# Patient Record
Sex: FEMALE
[De-identification: names, ages, dates, MRNs, and addresses within clinical notes are randomized per-mention and may not be internally consistent; named-entity substitution may affect disease eponyms.]

---

## 2017-06-29 NOTE — MAMMOGRAPHY REPORT
BILATERAL DIGITAL SCREENING MAMMOGRAM with CAD: 06/29/17 08:11:00



CLINICAL: Routine screening.



COMPARISON:06/28/16



FINDINGS: The breasts are almost entirely fatty. No mass, architectural 

distortion or suspicious calcifications.



IMPRESSION: No mammographic evidence of malignancy.



BI-RADS CATEGORY: 1 - - Negative



RECOMMENDATION: Routine mammographic screening in one year.





COMMENT:

Patient follow-up letters are generated by our [employer] application.

## 2019-01-14 ENCOUNTER — HOSPITAL ENCOUNTER (OUTPATIENT)
Dept: HOSPITAL 5 - WOUND | Age: 64
Discharge: HOME | End: 2019-01-14
Attending: SURGERY
Payer: COMMERCIAL

## 2019-01-14 DIAGNOSIS — L97.822: ICD-10-CM

## 2019-01-14 DIAGNOSIS — I70.248: Primary | ICD-10-CM

## 2019-01-14 DIAGNOSIS — Z95.5: ICD-10-CM

## 2019-01-14 DIAGNOSIS — I25.10: ICD-10-CM

## 2019-01-14 DIAGNOSIS — Z87.891: ICD-10-CM

## 2019-01-14 DIAGNOSIS — E78.00: ICD-10-CM

## 2019-01-14 PROCEDURE — A6250 SKIN SEAL PROTECT MOISTURIZR: HCPCS

## 2019-01-21 ENCOUNTER — HOSPITAL ENCOUNTER (OUTPATIENT)
Dept: HOSPITAL 5 - WOUND | Age: 64
Discharge: HOME | End: 2019-01-21
Attending: SURGERY
Payer: COMMERCIAL

## 2019-01-21 DIAGNOSIS — I70.248: Primary | ICD-10-CM

## 2019-01-21 DIAGNOSIS — L97.822: ICD-10-CM

## 2019-01-21 DIAGNOSIS — Z95.5: ICD-10-CM

## 2019-01-21 DIAGNOSIS — Z87.891: ICD-10-CM

## 2019-01-21 DIAGNOSIS — E78.00: ICD-10-CM

## 2019-01-21 DIAGNOSIS — I25.10: ICD-10-CM

## 2019-01-21 PROCEDURE — 97605 NEG PRS WND THER DME<=50SQCM: CPT

## 2019-02-04 ENCOUNTER — HOSPITAL ENCOUNTER (OUTPATIENT)
Dept: HOSPITAL 5 - WOUND | Age: 64
Discharge: HOME | End: 2019-02-04
Attending: SURGERY
Payer: COMMERCIAL

## 2019-02-04 DIAGNOSIS — L97.821: Primary | ICD-10-CM

## 2019-02-04 DIAGNOSIS — I25.10: ICD-10-CM

## 2019-02-04 DIAGNOSIS — Z87.891: ICD-10-CM

## 2019-02-04 DIAGNOSIS — E78.00: ICD-10-CM

## 2019-02-04 DIAGNOSIS — Z95.818: ICD-10-CM

## 2019-02-04 PROCEDURE — 87186 SC STD MICRODIL/AGAR DIL: CPT

## 2019-02-04 PROCEDURE — 87075 CULTR BACTERIA EXCEPT BLOOD: CPT

## 2019-02-04 PROCEDURE — 87116 MYCOBACTERIA CULTURE: CPT

## 2019-02-04 PROCEDURE — G0463 HOSPITAL OUTPT CLINIC VISIT: HCPCS

## 2019-02-04 PROCEDURE — 99215 OFFICE O/P EST HI 40 MIN: CPT

## 2019-02-11 ENCOUNTER — HOSPITAL ENCOUNTER (OUTPATIENT)
Dept: HOSPITAL 5 - WOUND | Age: 64
Discharge: HOME | End: 2019-02-11
Payer: COMMERCIAL

## 2019-02-18 ENCOUNTER — HOSPITAL ENCOUNTER (OUTPATIENT)
Dept: HOSPITAL 5 - WOUND | Age: 64
Discharge: HOME | End: 2019-02-18
Attending: SURGERY
Payer: COMMERCIAL

## 2019-02-18 DIAGNOSIS — E78.00: ICD-10-CM

## 2019-02-18 DIAGNOSIS — Z87.891: ICD-10-CM

## 2019-02-18 DIAGNOSIS — I25.10: ICD-10-CM

## 2019-02-18 DIAGNOSIS — L97.822: Primary | ICD-10-CM

## 2019-02-21 ENCOUNTER — HOSPITAL ENCOUNTER (INPATIENT)
Dept: HOSPITAL 5 - ED | Age: 64
LOS: 8 days | Discharge: HOME | DRG: 871 | End: 2019-03-01
Attending: INTERNAL MEDICINE | Admitting: INTERNAL MEDICINE
Payer: COMMERCIAL

## 2019-02-21 DIAGNOSIS — S81.802A: ICD-10-CM

## 2019-02-21 DIAGNOSIS — D64.9: ICD-10-CM

## 2019-02-21 DIAGNOSIS — R07.81: ICD-10-CM

## 2019-02-21 DIAGNOSIS — A41.9: Primary | ICD-10-CM

## 2019-02-21 DIAGNOSIS — F32.9: ICD-10-CM

## 2019-02-21 DIAGNOSIS — Z95.5: ICD-10-CM

## 2019-02-21 DIAGNOSIS — I25.10: ICD-10-CM

## 2019-02-21 DIAGNOSIS — Z79.899: ICD-10-CM

## 2019-02-21 DIAGNOSIS — J96.01: ICD-10-CM

## 2019-02-21 DIAGNOSIS — J47.9: ICD-10-CM

## 2019-02-21 DIAGNOSIS — Z90.49: ICD-10-CM

## 2019-02-21 DIAGNOSIS — L03.116: ICD-10-CM

## 2019-02-21 DIAGNOSIS — E66.2: ICD-10-CM

## 2019-02-21 DIAGNOSIS — Z82.49: ICD-10-CM

## 2019-02-21 DIAGNOSIS — E87.2: ICD-10-CM

## 2019-02-21 DIAGNOSIS — E87.1: ICD-10-CM

## 2019-02-21 DIAGNOSIS — I10: ICD-10-CM

## 2019-02-21 DIAGNOSIS — R65.21: ICD-10-CM

## 2019-02-21 DIAGNOSIS — I48.92: ICD-10-CM

## 2019-02-21 LAB
ALBUMIN SERPL-MCNC: 3.8 G/DL (ref 3.9–5)
ALT SERPL-CCNC: 15 UNITS/L (ref 7–56)
APTT BLD: 29.2 SEC. (ref 24.2–36.6)
APTT BLD: 34.4 SEC. (ref 24.2–36.6)
BASOPHILS # (AUTO): 0.1 K/MM3 (ref 0–0.1)
BASOPHILS NFR BLD AUTO: 0.5 % (ref 0–1.8)
BILIRUB UR QL STRIP: (no result)
BLOOD UR QL VISUAL: (no result)
BUN SERPL-MCNC: 21 MG/DL (ref 7–17)
BUN/CREAT SERPL: 21 %
CALCIUM SERPL-MCNC: 9.1 MG/DL (ref 8.4–10.2)
EOSINOPHIL # BLD AUTO: 0.1 K/MM3 (ref 0–0.4)
EOSINOPHIL NFR BLD AUTO: 1.3 % (ref 0–4.3)
HCT VFR BLD CALC: 30.3 % (ref 30.3–42.9)
HCT VFR BLD CALC: 37 % (ref 30.3–42.9)
HEMOLYSIS INDEX: 11
HGB BLD-MCNC: 12.1 GM/DL (ref 10.1–14.3)
HGB BLD-MCNC: 9.7 GM/DL (ref 10.1–14.3)
INR PPP: 1.02 (ref 0.87–1.13)
INR PPP: 1.13 (ref 0.87–1.13)
LYMPHOCYTES # BLD AUTO: 0.5 K/MM3 (ref 1.2–5.4)
LYMPHOCYTES NFR BLD AUTO: 4.8 % (ref 13.4–35)
MCHC RBC AUTO-ENTMCNC: 33 % (ref 30–34)
MCV RBC AUTO: 79 FL (ref 79–97)
MONOCYTES # (AUTO): 0.8 K/MM3 (ref 0–0.8)
MONOCYTES % (AUTO): 8.5 % (ref 0–7.3)
MUCOUS THREADS #/AREA URNS HPF: (no result) /HPF
PH UR STRIP: 5 [PH] (ref 5–7)
PLATELET # BLD: 328 K/MM3 (ref 140–440)
PLATELET # BLD: 356 K/MM3 (ref 140–440)
PROT UR STRIP-MCNC: (no result) MG/DL
RBC # BLD AUTO: 4.7 M/MM3 (ref 3.65–5.03)
RBC #/AREA URNS HPF: 6 /HPF (ref 0–6)
T4 FREE SERPL-MCNC: 0.99 NG/DL (ref 0.76–1.46)
UROBILINOGEN UR-MCNC: < 2 MG/DL (ref ?–2)
WBC #/AREA URNS HPF: < 1 /HPF (ref 0–6)

## 2019-02-21 PROCEDURE — 85027 COMPLETE CBC AUTOMATED: CPT

## 2019-02-21 PROCEDURE — 85049 AUTOMATED PLATELET COUNT: CPT

## 2019-02-21 PROCEDURE — 85730 THROMBOPLASTIN TIME PARTIAL: CPT

## 2019-02-21 PROCEDURE — 85379 FIBRIN DEGRADATION QUANT: CPT

## 2019-02-21 PROCEDURE — 84484 ASSAY OF TROPONIN QUANT: CPT

## 2019-02-21 PROCEDURE — 36415 COLL VENOUS BLD VENIPUNCTURE: CPT

## 2019-02-21 PROCEDURE — 94660 CPAP INITIATION&MGMT: CPT

## 2019-02-21 PROCEDURE — 84439 ASSAY OF FREE THYROXINE: CPT

## 2019-02-21 PROCEDURE — 83735 ASSAY OF MAGNESIUM: CPT

## 2019-02-21 PROCEDURE — 85025 COMPLETE CBC W/AUTO DIFF WBC: CPT

## 2019-02-21 PROCEDURE — 93306 TTE W/DOPPLER COMPLETE: CPT

## 2019-02-21 PROCEDURE — 80048 BASIC METABOLIC PNL TOTAL CA: CPT

## 2019-02-21 PROCEDURE — 81001 URINALYSIS AUTO W/SCOPE: CPT

## 2019-02-21 PROCEDURE — 85520 HEPARIN ASSAY: CPT

## 2019-02-21 PROCEDURE — 85610 PROTHROMBIN TIME: CPT

## 2019-02-21 PROCEDURE — 94760 N-INVAS EAR/PLS OXIMETRY 1: CPT

## 2019-02-21 PROCEDURE — 36600 WITHDRAWAL OF ARTERIAL BLOOD: CPT

## 2019-02-21 PROCEDURE — 93010 ELECTROCARDIOGRAM REPORT: CPT

## 2019-02-21 PROCEDURE — 87400 INFLUENZA A/B EACH AG IA: CPT

## 2019-02-21 PROCEDURE — 96361 HYDRATE IV INFUSION ADD-ON: CPT

## 2019-02-21 PROCEDURE — 85018 HEMOGLOBIN: CPT

## 2019-02-21 PROCEDURE — 5A2204Z RESTORATION OF CARDIAC RHYTHM, SINGLE: ICD-10-PCS | Performed by: INTERNAL MEDICINE

## 2019-02-21 PROCEDURE — 84443 ASSAY THYROID STIM HORMONE: CPT

## 2019-02-21 PROCEDURE — 85014 HEMATOCRIT: CPT

## 2019-02-21 PROCEDURE — 02HV33Z INSERTION OF INFUSION DEVICE INTO SUPERIOR VENA CAVA, PERCUTANEOUS APPROACH: ICD-10-PCS | Performed by: INTERNAL MEDICINE

## 2019-02-21 PROCEDURE — 86140 C-REACTIVE PROTEIN: CPT

## 2019-02-21 PROCEDURE — 93970 EXTREMITY STUDY: CPT

## 2019-02-21 PROCEDURE — 96365 THER/PROPH/DIAG IV INF INIT: CPT

## 2019-02-21 PROCEDURE — 93005 ELECTROCARDIOGRAM TRACING: CPT

## 2019-02-21 PROCEDURE — 87086 URINE CULTURE/COLONY COUNT: CPT

## 2019-02-21 PROCEDURE — 94640 AIRWAY INHALATION TREATMENT: CPT

## 2019-02-21 PROCEDURE — 82803 BLOOD GASES ANY COMBINATION: CPT

## 2019-02-21 PROCEDURE — 71045 X-RAY EXAM CHEST 1 VIEW: CPT

## 2019-02-21 PROCEDURE — 87040 BLOOD CULTURE FOR BACTERIA: CPT

## 2019-02-21 PROCEDURE — 71275 CT ANGIOGRAPHY CHEST: CPT

## 2019-02-21 PROCEDURE — 96375 TX/PRO/DX INJ NEW DRUG ADDON: CPT

## 2019-02-21 PROCEDURE — 80053 COMPREHEN METABOLIC PANEL: CPT

## 2019-02-21 PROCEDURE — 82140 ASSAY OF AMMONIA: CPT

## 2019-02-21 PROCEDURE — 71046 X-RAY EXAM CHEST 2 VIEWS: CPT

## 2019-02-21 PROCEDURE — 82805 BLOOD GASES W/O2 SATURATION: CPT

## 2019-02-21 RX ADMIN — Medication SCH ML: at 23:08

## 2019-02-21 RX ADMIN — HEPARIN SODIUM SCH MLS/HR: 5000 INJECTION, SOLUTION INTRAVENOUS at 21:22

## 2019-02-21 RX ADMIN — OXYCODONE AND ACETAMINOPHEN PRN TAB: 5; 325 TABLET ORAL at 23:30

## 2019-02-21 RX ADMIN — BUPROPION HYDROCHLORIDE SCH MG: 100 TABLET, FILM COATED, EXTENDED RELEASE ORAL at 23:35

## 2019-02-21 NOTE — XRAY REPORT
FINAL REPORT



EXAM:  XR CHEST 1V AP



HISTORY:  s/p central line placement 



TECHNIQUE:  Single AP chest 



PRIORS:  None.



FINDINGS:  

There is a right IJ catheter tip the SVC. There is some patchy increased opacity within the left uppe
r lobe which may be acute or chronic finding. No evidence for pneumothorax. Pulmonary vasculature fátima
ears within normal limits. 



IMPRESSION:  

Right IJ catheter in satisfactory position 



Patchy increased opacity in the left upper lobe which may be acute or chronic finding. Continued foll
owup suggested.

## 2019-02-21 NOTE — EMERGENCY DEPARTMENT REPORT
ED Shortness of Breath HPI





- General


Chief Complaint: Dyspnea/Respdistress


Stated Complaint: VICKY


Time Seen by Provider: 02/21/19 10:12


Source: patient, old records reviewed


Mode of arrival: Wheelchair


Limitations: No Limitations





- History of Present Illness


Initial Comments: 





64-year-old female with a past medical history of CAD with stent, obesity, hype

rtension, and previous appendectomy presents to Hospital with complaints of 

shortness of breath and mid chest pain 2 days.  Chest pain worsened last night 

described as pleuritic and worse with deep inspiration.  Patient splinting 

secondary to pain.  Patient was prescribed Bactrim and has been taking 1 tablet 

per week.  She noticed that the medication should have been taken twice a day 

and has increased this dose for the past 2 days.  Patient states she's been 

having some nausea with dry heaves.  She states that she is no longer taking 

aspirin and Plavix due to her previous hematoma in her left leg.  Dr. Steel 

performed surgery on January 4 for her left leg hematoma that resulted from a 

November fall.  Patient states she had a negative stress test this past fall and

denies a history of CHF.  Cardiologist: Dr. Nava PMD: A nurse practitioner in Dr. schuster's formal office.  Patient receives home wound care and continues to see 

Dr. Steel as outpatient.  Dr. Steel prescribed the Bactrim





- Related Data


                                Home Medications











 Medication  Instructions  Recorded  Confirmed  Last Taken


 


Alendronate Sodium [Fosamax] 70 mg PO QWEEK 01/02/19 01/04/19 01/03/19 09:00


 


Atorvastatin [Lipitor] 80 mg PO QHS 01/02/19 01/04/19 01/03/19 21:00


 


Citalopram Hydrobromide 20 mg PO DAILY 01/02/19 01/04/19 01/03/19 09:00





[Citalopram HBr]    


 


Clopidogrel Bisulfate [Clopidogrel] 75 mg PO DAILY 01/02/19 01/04/19 01/03/19 

09:00


 


Cyanocobalamin [Vitamin B-12] 1,000 mcg IM QMONTH 01/02/19 01/04/19 12/14/18


 


Lisinopril [Zestril TAB] 40 mg PO DAILY 01/02/19 01/04/19 01/03/19 09:00


 


Metoprolol [Lopressor TAB] 50 mg PO DAILY 01/02/19 01/04/19 01/03/19 09:00


 


buPROPion HCl [Bupropion HCl ER] 200 mg PO BID 01/02/19 01/04/19 01/03/19 21:00








                                  Previous Rx's











 Medication  Instructions  Recorded  Last Taken  Type


 


oxyCODONE /ACETAMINOPHEN [Percocet 2 tab PO Q6H PRN #30 tablet 01/04/19 Unknown 

Rx





5/325 mg]    











                                    Allergies











Allergy/AdvReac Type Severity Reaction Status Date / Time


 


No Known Allergies Allergy   Verified 02/21/19 09:14














ED Review of Systems


ROS: 


Stated complaint: VICKY


Other details as noted in HPI





Comment: All other systems reviewed and negative





ED Past Medical Hx





- Past Medical History


Previous Medical History?: Yes


Hx Hypertension: Yes (x 10 yrs)





- Surgical History


Past Surgical History?: Yes


Hx Coronary Stent: Yes (x1 2008)


Hx Appendectomy: Yes





- Social History


Smoking Status: Never Smoker


Substance Use Type: None





- Medications


Home Medications: 


                                Home Medications











 Medication  Instructions  Recorded  Confirmed  Last Taken  Type


 


Alendronate Sodium [Fosamax] 70 mg PO QWEEK 01/02/19 01/04/19 01/03/19 09:00 

History


 


Atorvastatin [Lipitor] 80 mg PO QHS 01/02/19 01/04/19 01/03/19 21:00 History


 


Citalopram Hydrobromide 20 mg PO DAILY 01/02/19 01/04/19 01/03/19 09:00 History





[Citalopram HBr]     


 


Clopidogrel Bisulfate [Clopidogrel] 75 mg PO DAILY 01/02/19 01/04/19 01/03/19 

09:00 History


 


Cyanocobalamin [Vitamin B-12] 1,000 mcg IM QMONTH 01/02/19 01/04/19 12/14/18 

History


 


Lisinopril [Zestril TAB] 40 mg PO DAILY 01/02/19 01/04/19 01/03/19 09:00 History


 


Metoprolol [Lopressor TAB] 50 mg PO DAILY 01/02/19 01/04/19 01/03/19 09:00 

History


 


buPROPion HCl [Bupropion HCl ER] 200 mg PO BID 01/02/19 01/04/19 01/03/19 21:00 

History


 


oxyCODONE /ACETAMINOPHEN [Percocet 2 tab PO Q6H PRN #30 tablet 01/04/19  Unknown

 Rx





5/325 mg]     














ED Physical Exam





- General


Limitations: No Limitations





- Other


Other exam information: 





General: No limitations, patient is alert in no acute distress


Head exam: Atraumatic, normocephalic


Eyes exam: Normal appearance


ENT: Moist mucous membrane


Neck exam: Normal inspection, full range of motion, no meningismus nontender


Respiratory exam: Clear to auscultation bilateral, no wheezes, rales, crackles, 

tachypnea.  Chest wall nontender


Cardiovascular: Tachycardic regular rhythm


Abdomen: Soft, nondistended, and  nontender, with normal bowel sounds, no 

rebound, or guarding


Extremity: Full range of motion, left lower leg with anterior wall with packing.

 No significant purulent drainage or warmth.  Mild erythema.  Mildly tender.


Back: Normal Inspection, full range of motion, no tenderness


Neurologic: Alert, oriented x3, cranial nerves intact, no motor or sensory 

deficit


Psychiatric: normal affect, normal mood


Skin: Warm, dry, intact





ED Course


                                   Vital Signs











  02/21/19 02/21/19 02/21/19





  09:56 15:05 15:40


 


Temperature 98.8 F 100.5 F H 98.8 F


 


Pulse Rate 51 L 155 H 144 H


 


Respiratory 22  22





Rate   


 


Blood Pressure 103/76 113/62 


 


Blood Pressure   102/61





[Left]   


 


O2 Sat by Pulse 97 99 98





Oximetry   














  02/21/19 02/21/19 02/21/19





  16:13 16:14 16:16


 


Temperature   


 


Pulse Rate 150 H 152 H 153 H


 


Respiratory 22 26 H 31 H





Rate   


 


Blood Pressure   104/46


 


Blood Pressure   





[Left]   


 


O2 Sat by Pulse   





Oximetry   














  02/21/19 02/21/19 02/21/19





  16:18 16:20 16:22


 


Temperature   


 


Pulse Rate 152 H 152 H 152 H


 


Respiratory 26 H 33 H 28 H





Rate   


 


Blood Pressure 104/46 104/46 104/46


 


Blood Pressure   





[Left]   


 


O2 Sat by Pulse   





Oximetry   














  02/21/19 02/21/19 02/21/19





  16:24 16:26 16:28


 


Temperature   


 


Pulse Rate 153 H 153 H 152 H


 


Respiratory 30 H 27 H 32 H





Rate   


 


Blood Pressure 104/46 104/46 104/46


 


Blood Pressure   





[Left]   


 


O2 Sat by Pulse   





Oximetry   














  02/21/19 02/21/19 02/21/19





  16:30 16:31 16:56


 


Temperature   


 


Pulse Rate 147 H 152 H 135 H


 


Respiratory 31 H 31 H 30 H





Rate   


 


Blood Pressure 104/46 97/47 118/97


 


Blood Pressure   





[Left]   


 


O2 Sat by Pulse   





Oximetry   














  02/21/19 02/21/19 02/21/19





  16:58 17:00 17:01


 


Temperature   


 


Pulse Rate 153 H 152 H 152 H


 


Respiratory 35 H 32 H 35 H





Rate   


 


Blood Pressure 104/46 91/47 84/39


 


Blood Pressure   





[Left]   


 


O2 Sat by Pulse   





Oximetry   














  02/21/19 02/21/19 02/21/19





  17:02 17:04 17:06


 


Temperature   


 


Pulse Rate 150 H 152 H 148 H


 


Respiratory 33 H 32 H 33 H





Rate   


 


Blood Pressure 84/39 84/39 84/39


 


Blood Pressure   





[Left]   


 


O2 Sat by Pulse   





Oximetry   














  02/21/19 02/21/19 02/21/19





  17:08 17:10 17:12


 


Temperature   


 


Pulse Rate 150 H 148 H 134 H


 


Respiratory 36 H 30 H 34 H





Rate   


 


Blood Pressure 75/38 75/38 75/38


 


Blood Pressure   





[Left]   


 


O2 Sat by Pulse   





Oximetry   














  02/21/19 02/21/19 02/21/19





  17:14 17:16 17:18


 


Temperature   


 


Pulse Rate 142 H 126 H 149 H


 


Respiratory 26 H 32 H 23





Rate   


 


Blood Pressure 75/38 135/86 135/86


 


Blood Pressure   





[Left]   


 


O2 Sat by Pulse   





Oximetry   














  02/21/19 02/21/19 02/21/19





  17:20 17:22 17:24


 


Temperature   


 


Pulse Rate 130 H 126 H 149 H


 


Respiratory 33 H 27 H 27 H





Rate   


 


Blood Pressure 135/86 135/86 135/86


 


Blood Pressure   





[Left]   


 


O2 Sat by Pulse   





Oximetry   














  02/21/19 02/21/19 02/21/19





  17:26 17:28 17:30


 


Temperature   


 


Pulse Rate 136 H 140 H 134 H


 


Respiratory 21 33 H 33 H





Rate   


 


Blood Pressure 135/86 135/86 135/86


 


Blood Pressure   





[Left]   


 


O2 Sat by Pulse   





Oximetry   














  02/21/19 02/21/19 02/21/19





  17:31 17:32 17:34


 


Temperature   


 


Pulse Rate 141 H 138 H 139 H


 


Respiratory 28 H 34 H 28 H





Rate   


 


Blood Pressure 104/55 104/55 104/55


 


Blood Pressure   





[Left]   


 


O2 Sat by Pulse   





Oximetry   














  02/21/19 02/21/19 02/21/19





  17:36 17:38 17:40


 


Temperature   


 


Pulse Rate 145 H 147 H 148 H


 


Respiratory 25 H 36 H 36 H





Rate   


 


Blood Pressure 104/55 104/55 104/55


 


Blood Pressure   





[Left]   


 


O2 Sat by Pulse   98





Oximetry   














  02/21/19 02/21/19 02/21/19





  17:42 17:44 17:46


 


Temperature   


 


Pulse Rate 148 H 143 H 149 H


 


Respiratory 35 H 34 H 34 H





Rate   


 


Blood Pressure 104/55 104/55 85/49


 


Blood Pressure   





[Left]   


 


O2 Sat by Pulse   





Oximetry   














  02/21/19 02/21/19 02/21/19





  17:48 17:50 17:52


 


Temperature   


 


Pulse Rate 143 H 143 H 147 H


 


Respiratory 33 H 33 H 29 H





Rate   


 


Blood Pressure 85/49 85/49 85/49


 


Blood Pressure   





[Left]   


 


O2 Sat by Pulse   





Oximetry   














  02/21/19 02/21/19 02/21/19





  17:54 17:56 17:58


 


Temperature   


 


Pulse Rate 147 H 137 H 141 H


 


Respiratory 30 H 34 H 35 H





Rate   


 


Blood Pressure 85/49 85/49 85/49


 


Blood Pressure   





[Left]   


 


O2 Sat by Pulse   





Oximetry   














  02/21/19 02/21/19 02/21/19





  18:00 18:02 18:04


 


Temperature   


 


Pulse Rate 133 H 141 H 141 H


 


Respiratory 37 H 34 H 36 H





Rate   


 


Blood Pressure 85/49 85/49 85/49


 


Blood Pressure   





[Left]   


 


O2 Sat by Pulse   





Oximetry   














  02/21/19 02/21/19 02/21/19





  18:06 18:08 18:10


 


Temperature   


 


Pulse Rate 142 H 142 H 143 H


 


Respiratory 26 H 37 H 27 H





Rate   


 


Blood Pressure 85/49 85/49 85/49


 


Blood Pressure   





[Left]   


 


O2 Sat by Pulse   





Oximetry   














  02/21/19 02/21/19 02/21/19





  18:12 18:14 18:16


 


Temperature   


 


Pulse Rate 142 H 134 H 138 H


 


Respiratory 33 H 25 H 27 H





Rate   


 


Blood Pressure 85/49 85/49 85/49


 


Blood Pressure   





[Left]   


 


O2 Sat by Pulse   





Oximetry   














  02/21/19 02/21/19 02/21/19





  18:18 18:20 18:22


 


Temperature   


 


Pulse Rate 144 H 142 H 140 H


 


Respiratory 27 H 33 H 28 H





Rate   


 


Blood Pressure 85/49 85/49 85/49


 


Blood Pressure   





[Left]   


 


O2 Sat by Pulse   





Oximetry   














  02/21/19 02/21/19 02/21/19





  18:24 18:26 18:28


 


Temperature   


 


Pulse Rate 137 H 134 H 144 H


 


Respiratory 31 H 29 H 31 H





Rate   


 


Blood Pressure 85/49 85/49 85/49


 


Blood Pressure   





[Left]   


 


O2 Sat by Pulse   





Oximetry   














  02/21/19





  18:30


 


Temperature 


 


Pulse Rate 141 H


 


Respiratory 30 H





Rate 


 


Blood Pressure 85/49


 


Blood Pressure 





[Left] 


 


O2 Sat by Pulse 





Oximetry 














- Consultations


Consultation #1: 





02/21/19 17:10


Case discussed with Dr. JOSE Shanks on call cardiologist for Dr. Nava.  Informed that

 Cardizem bolus did not help with tachycardia and blood pressure drop from 100 

systolic to the 70s after initial bolus.  Recommends digoxin 0.5 mg IV load, 

amiodarone 150 IV load followed by amiodarone drip.


02/21/19 19:49


Received follow-up call from Dr. JOSE Shanks.  Informed that patient remains 

tachycardic in the 130s to 150s.  Will blood pressure cuff chain systolic 

pressure is 100.  Recommends to try metoprolol 5 mg IV.  Recommends additional 

IV fluids.  Patient has received 1 L normal saline eschar and no signs of CHF on

 CT scan after IVF.  Additional 2 L ordered








Consultation #2: 





02/21/19 20:19


Also ordered for Dr. Steel to be done during admission


02/21/19 20:21


Critical care consult ordered





- Central Line Placement


  ** Right IJ


Consent Obtained: written consent


Time Out Performed: Yes


Patient Placed on Monitor/Pulse Ox: Yes


MD Prep: mask, gown, gloves


Central Line Prep: Chlorhexidine scrub, sterile drapes applied


Local Anesthesia Used: Lidocaine 1%


Amount of Anesthesia Used (mls): 5


Ultrasound Used for Placement: Yes


Central Line Lumen Inserted: triple


Bloods Obtained for Lab: Yes


Central Line Position: good blood return, sutured in place with nyl


Dressing Applied: Tegaderm


Post Procedure X-Ray: tip of catheter in good p


Patient Tolerated Procedure: well


Complications: none





ED Medical Decision Making





- Lab Data


Result diagrams: 


                                 02/21/19 10:08





                                 02/21/19 10:08








                                   Lab Results











  02/21/19 02/21/19 02/21/19 Range/Units





  10:08 10:08 10:08 


 


WBC  9.9    (4.5-11.0)  K/mm3


 


RBC  4.70    (3.65-5.03)  M/mm3


 


Hgb  12.1    (10.1-14.3)  gm/dl


 


Hct  37.0    (30.3-42.9)  %


 


MCV  79    (79-97)  fl


 


MCH  26 L    (28-32)  pg


 


MCHC  33    (30-34)  %


 


RDW  17.6 H    (13.2-15.2)  %


 


Plt Count  356    (140-440)  K/mm3


 


Lymph % (Auto)  4.8 L    (13.4-35.0)  %


 


Mono % (Auto)  8.5 H    (0.0-7.3)  %


 


Eos % (Auto)  1.3    (0.0-4.3)  %


 


Baso % (Auto)  0.5    (0.0-1.8)  %


 


Lymph #  0.5 L    (1.2-5.4)  K/mm3


 


Mono #  0.8    (0.0-0.8)  K/mm3


 


Eos #  0.1    (0.0-0.4)  K/mm3


 


Baso #  0.1    (0.0-0.1)  K/mm3


 


Seg Neutrophils %  84.9 H    (40.0-70.0)  %


 


Seg Neutrophils #  8.4 H    (1.8-7.7)  K/mm3


 


PT     (12.2-14.9)  Sec.


 


INR     (0.87-1.13)  


 


APTT     (24.2-36.6)  Sec.


 


D-Dimer     (0-234)  ng/mlDDU


 


VBG pH     (7.320-7.420)  


 


Sodium    134 L  (137-145)  mmol/L


 


Potassium    4.4  (3.6-5.0)  mmol/L


 


Chloride    99.9  ()  mmol/L


 


Carbon Dioxide    20 L  (22-30)  mmol/L


 


Anion Gap    19  mmol/L


 


BUN    21 H  (7-17)  mg/dL


 


Creatinine    1.0  (0.7-1.2)  mg/dL


 


Estimated GFR    56  ml/min


 


BUN/Creatinine Ratio    21  %


 


Glucose    138 H  ()  mg/dL


 


Lactic Acid     (0.7-2.0)  mmol/L


 


Calcium    9.1  (8.4-10.2)  mg/dL


 


Magnesium     (1.7-2.3)  mg/dL


 


Total Bilirubin    0.30  (0.1-1.2)  mg/dL


 


AST    18  (5-40)  units/L


 


ALT    15  (7-56)  units/L


 


Alkaline Phosphatase    93  ()  units/L


 


Troponin T   < 0.010   (0.00-0.029)  ng/mL


 


Total Protein    7.0  (6.3-8.2)  g/dL


 


Albumin    3.8 L  (3.9-5)  g/dL


 


Albumin/Globulin Ratio    1.2  %


 


TSH     (0.270-4.200)  mlU/mL


 


Free T4     (0.76-1.46)  ng/dL














  02/21/19 02/21/19 02/21/19 Range/Units





  15:52 16:57 16:57 


 


WBC     (4.5-11.0)  K/mm3


 


RBC     (3.65-5.03)  M/mm3


 


Hgb     (10.1-14.3)  gm/dl


 


Hct     (30.3-42.9)  %


 


MCV     (79-97)  fl


 


MCH     (28-32)  pg


 


MCHC     (30-34)  %


 


RDW     (13.2-15.2)  %


 


Plt Count     (140-440)  K/mm3


 


Lymph % (Auto)     (13.4-35.0)  %


 


Mono % (Auto)     (0.0-7.3)  %


 


Eos % (Auto)     (0.0-4.3)  %


 


Baso % (Auto)     (0.0-1.8)  %


 


Lymph #     (1.2-5.4)  K/mm3


 


Mono #     (0.0-0.8)  K/mm3


 


Eos #     (0.0-0.4)  K/mm3


 


Baso #     (0.0-0.1)  K/mm3


 


Seg Neutrophils %     (40.0-70.0)  %


 


Seg Neutrophils #     (1.8-7.7)  K/mm3


 


PT   14.0   (12.2-14.9)  Sec.


 


INR   1.02   (0.87-1.13)  


 


APTT   34.4   (24.2-36.6)  Sec.


 


D-Dimer   519.87 H   (0-234)  ng/mlDDU


 


VBG pH     (7.320-7.420)  


 


Sodium     (137-145)  mmol/L


 


Potassium     (3.6-5.0)  mmol/L


 


Chloride     ()  mmol/L


 


Carbon Dioxide     (22-30)  mmol/L


 


Anion Gap     mmol/L


 


BUN     (7-17)  mg/dL


 


Creatinine     (0.7-1.2)  mg/dL


 


Estimated GFR     ml/min


 


BUN/Creatinine Ratio     %


 


Glucose     ()  mg/dL


 


Lactic Acid     (0.7-2.0)  mmol/L


 


Calcium     (8.4-10.2)  mg/dL


 


Magnesium     (1.7-2.3)  mg/dL


 


Total Bilirubin     (0.1-1.2)  mg/dL


 


AST     (5-40)  units/L


 


ALT     (7-56)  units/L


 


Alkaline Phosphatase     ()  units/L


 


Troponin T  < 0.010    (0.00-0.029)  ng/mL


 


Total Protein     (6.3-8.2)  g/dL


 


Albumin     (3.9-5)  g/dL


 


Albumin/Globulin Ratio     %


 


TSH    0.892  (0.270-4.200)  mlU/mL


 


Free T4    0.99  (0.76-1.46)  ng/dL














  02/21/19 02/21/19 02/21/19 Range/Units





  17:20 17:35 17:35 


 


WBC     (4.5-11.0)  K/mm3


 


RBC     (3.65-5.03)  M/mm3


 


Hgb     (10.1-14.3)  gm/dl


 


Hct     (30.3-42.9)  %


 


MCV     (79-97)  fl


 


MCH     (28-32)  pg


 


MCHC     (30-34)  %


 


RDW     (13.2-15.2)  %


 


Plt Count     (140-440)  K/mm3


 


Lymph % (Auto)     (13.4-35.0)  %


 


Mono % (Auto)     (0.0-7.3)  %


 


Eos % (Auto)     (0.0-4.3)  %


 


Baso % (Auto)     (0.0-1.8)  %


 


Lymph #     (1.2-5.4)  K/mm3


 


Mono #     (0.0-0.8)  K/mm3


 


Eos #     (0.0-0.4)  K/mm3


 


Baso #     (0.0-0.1)  K/mm3


 


Seg Neutrophils %     (40.0-70.0)  %


 


Seg Neutrophils #     (1.8-7.7)  K/mm3


 


PT     (12.2-14.9)  Sec.


 


INR     (0.87-1.13)  


 


APTT     (24.2-36.6)  Sec.


 


D-Dimer     (0-234)  ng/mlDDU


 


VBG pH    7.359  (7.320-7.420)  


 


Sodium     (137-145)  mmol/L


 


Potassium     (3.6-5.0)  mmol/L


 


Chloride     ()  mmol/L


 


Carbon Dioxide     (22-30)  mmol/L


 


Anion Gap     mmol/L


 


BUN     (7-17)  mg/dL


 


Creatinine     (0.7-1.2)  mg/dL


 


Estimated GFR     ml/min


 


BUN/Creatinine Ratio     %


 


Glucose     ()  mg/dL


 


Lactic Acid   1.10   (0.7-2.0)  mmol/L


 


Calcium     (8.4-10.2)  mg/dL


 


Magnesium  1.90    (1.7-2.3)  mg/dL


 


Total Bilirubin     (0.1-1.2)  mg/dL


 


AST     (5-40)  units/L


 


ALT     (7-56)  units/L


 


Alkaline Phosphatase     ()  units/L


 


Troponin T     (0.00-0.029)  ng/mL


 


Total Protein     (6.3-8.2)  g/dL


 


Albumin     (3.9-5)  g/dL


 


Albumin/Globulin Ratio     %


 


TSH     (0.270-4.200)  mlU/mL


 


Free T4     (0.76-1.46)  ng/dL














- EKG Data


-: EKG Interpreted by Me


EKG shows normal: axis (qrs =73), QRS complexes (qrsd 86), ST-T waves (no stemi)


Rate: tachycardia (139)





- EKG Data


When compared to previous EKG there are: previous EKG unavailable





02/21/19 20:07


Repeat EKG at 16:46 shows A flutter with 2-1 block rate 151.  QRS axis -57, QRS 

duration 146, no ST elevation MI





- Radiology Data


Radiology results: report reviewed











CHEST TWO VIEWS: 02/21/19 10:22 





CLINICAL: Shortness of breath. No comparison. 





FINDINGS: Somewhat vague opacities in the left upper lobe with volume 


loss and a prominent left hilum with adjacent surgical clips. The left 


lower lobe is clear. The right lung is normally expanded and clear. 


The heart is normal size but is shifted to the left. Mild degenerative 


changes in the spine. No suspicious bone lesions. 





IMPRESSION: Probable chronic postsurgical changes of a left upper lobe 


after partial lobectomy.No acute pneumonia or CHF. 











FINAL REPORT 





PROCEDURE: CT angiogram chest with contrast. 





TECHNIQUE: Computerized tomographic angiography of the chest was performed after

 the IV injection 


of iodinated nonionic contrast including image processing. The image data was 

postprocessed using 2-


dimensional multiplanar reformatted (MPR) and 3-dimensional (MIP and/or volume 

rendered) techniques.








HISTORY: Chest pain, shortness of breath, tachycardia, elevated D-dimer. 





COMPARISON: No prior studies are available for comparison. 





FINDINGS: 


The trachea and central bronchi appear normal. The right lung is clear and well 

expanded. There are


numerous small dilated bronchi in the anterior portion of the left upper lobe. 

These bronchi have 


dense surrounding opacity. The findings are consistent with bronchiectasis with 

surrounding 


fibrosis. There are no definite mass lesions. The remainder of the left lung is 

clear. There are no 


pleural effusions. The thoracic aorta has a normal caliber without evidence of 

dissection. The 


pulmonary arteries enhance normally. There are no signs of pulmonary embolism. 

There is no 


mediastinal adenopathy. The heart size is normal. The adrenal glands are not 

enlarged. The thoracic 


skeleton appears intact. 





IMPRESSION: 


No evidence of pulmonary embolism. Probable chronic fibrosis and bronchiectasis 

in the anterior 


portion of the left upper lobe. 








FINAL REPORT 





EXAM: XR CHEST 1V AP 





HISTORY: s/p central line placement 





TECHNIQUE: Single AP chest 





PRIORS: None. 





FINDINGS: 


There is a right IJ catheter tip the SVC. There is some patchy increased opacity

 within the left 


upper lobe which may be acute or chronic finding. No evidence for pneumothorax. 

Pulmonary 


vasculature appears within normal limits. 





IMPRESSION: 


Right IJ catheter in satisfactory position 





Patchy increased opacity in the left upper lobe which may be acute or chronic 

finding. Continued 


followup suggested. 














- Medical Decision Making





Patient presents to the hospital with A. fib with RVR that is refractory to 

medication in the ED.  It is unclear as whether not this is caused by sepsis or 

primary arrhythmia.  Patient is on antibiotics/Bactrim for her leg but does not 

have any leukocytosis pregnancies although there is a shift) or elevated lactic 

acid.  Patient has pleuritic chest pain and is splinting secondary to pain.  D-

dimer was elevated.  CT angiogram chest was negative for pulmonary embolism.  

Central line placed due to persistent tachycardia and borderline hypotension.  

Case discussed with cardiologist see consult. Hospitalist informed of admission 

Dr Varela. ICU admission orders will be placed.





Patient was empirically given vancomycin for possible leg infection.  Zosyn also

 ordered given increasing left upper lobe opacity with IV hydration.





- Differential Diagnosis


PE, A. fib, a flutter, sepsis


Critical Care Time: Yes


Critical care time in (mins) excluding proc time.: 65


Critical care attestation.: 


If time is entered above; I have spent that time in minutes in the direct care 

of this critically ill patient, excluding procedure time.








ED Disposition


Clinical Impression: 


 Atrial flutter with rapid ventricular response, Pleuritic chest pain, Pulmonary

 infiltrate, Leg wound, left





Disposition: [id number] OP ADMIT IP TO THIS HOSP


Is pt being admited?: Yes


Condition: Stable


Instructions:  Chest Pain (ED)


Time of Disposition: 20:13 (Dr Varela/hospitalist)

## 2019-02-21 NOTE — CAT SCAN REPORT
FINAL REPORT



PROCEDURE:  CT angiogram chest with contrast. 



TECHNIQUE:  Computerized tomographic angiography of the chest was performed after the IV injection of
 iodinated nonionic contrast including image processing. The image data was postprocessed using 2-dim
ensional multiplanar reformatted (MPR) and 3-dimensional (MIP and/or volume rendered) techniques. 



HISTORY:  Chest pain, shortness of breath, tachycardia, elevated D-dimer. 



COMPARISON:  No prior studies are available for comparison.



FINDINGS:  

The trachea and central bronchi appear normal. The right lung is clear and well expanded. There are n
umerous small dilated bronchi in the anterior portion of the left upper lobe. These bronchi have dens
e surrounding opacity. The findings are consistent with bronchiectasis with surrounding fibrosis. The
re are no definite mass lesions. The remainder of the left lung is clear. There are no pleural effusi
ons. The thoracic aorta has a normal caliber without evidence of dissection. The pulmonary arteries e
nhance normally. There are no signs of pulmonary embolism. There is no mediastinal adenopathy. The he
art size is normal. The adrenal glands are not enlarged. The thoracic skeleton appears intact. 



IMPRESSION:  

No evidence of pulmonary embolism. Probable chronic fibrosis and bronchiectasis in the anterior porti
on of the left upper lobe.

## 2019-02-21 NOTE — HISTORY AND PHYSICAL REPORT
History of Present Illness


Chief complaint: 





I cant breathe


History of present illness: 


65 YO Female with MO,Obesity Hypoventilation on CPAP,  CAD S/P Stent Placement, 

HTN presents to ED for evaluation. Pt states that she has experienced shortness 

of breath, chest palpitations, and chest discomfort over the past 2 days with 

worsening symptoms over the same time frame. Pt seen and evaluated by her PCP 

and was initially treated with oral antibiotic therapy without relief of 

symptoms that were suspected secondary to a respiratory tract infection. Pt 

subsequently presents to ED for evaluation. Pt seen and evaluated in ED and 

found to have Atrial Flutter with RVR, Acute Respiratory Failure, as well as 

Left Leg Cellulitis. Pt treated with medical cardioversion with cardizem without

improvement. Cardiology consulted in ED. Pt then placed on amidarone drip and 

heparin drip as per cardiology request. Pt declined synchronized cardioversion. 

Pt admitted to ICU. Pt denies fever, chills, NVD, Trauma, BRBPR, Hemoptysis, or 

recent ill contacts. 





Past History


Past Medical History: CAD, other (Obesity Hypoventilation,)


Past Surgical History: appendectomy, Other (stent placement)


Social history: , lives with family.  denies: smoking, alcohol abuse, 

prescription drug abuse


Family history: hypertension





Medications and Allergies


                                    Allergies











Allergy/AdvReac Type Severity Reaction Status Date / Time


 


No Known Allergies Allergy   Verified 02/21/19 09:14











                                Home Medications











 Medication  Instructions  Recorded  Confirmed  Last Taken  Type


 


Alendronate Sodium [Fosamax] 70 mg PO QWEEK 01/02/19 01/04/19 01/03/19 09:00 

History


 


Atorvastatin [Lipitor] 80 mg PO QHS 01/02/19 01/04/19 01/03/19 21:00 History


 


Citalopram Hydrobromide 20 mg PO DAILY 01/02/19 01/04/19 01/03/19 09:00 History





[Citalopram HBr]     


 


Clopidogrel Bisulfate [Clopidogrel] 75 mg PO DAILY 01/02/19 01/04/19 01/03/19 

09:00 History


 


Cyanocobalamin [Vitamin B-12] 1,000 mcg IM QMONTH 01/02/19 01/04/19 12/14/18 

History


 


Lisinopril [Zestril TAB] 40 mg PO DAILY 01/02/19 01/04/19 01/03/19 09:00 History


 


Metoprolol [Lopressor TAB] 50 mg PO DAILY 01/02/19 01/04/19 01/03/19 09:00 

History


 


buPROPion HCl [Bupropion HCl ER] 200 mg PO BID 01/02/19 01/04/19 01/03/19 21:00 

History


 


oxyCODONE /ACETAMINOPHEN [Percocet 2 tab PO Q6H PRN #30 tablet 01/04/19  Unknown

 Rx





5/325 mg]     











Active Meds: 


Active Medications





Amiodarone HCl 900 mg/ (Dextrose)  500 mls @ 33.33 mls/hr IV AS DIRECT TIAN; 

Protocol


   Last Admin: 02/21/19 18:00 Dose:  1 mg/min, 33.33 mls/hr


   Documented by: 


Vancomycin HCl 2,000 mg/ (Sodium Chloride)  540 mls @ 333 mls/hr IV ONCE ONE; 

Protocol


   Stop: 02/21/19 20:22


Sodium Chloride (Nacl 0.9% 1000 Ml)  1,000 mls @ 999 mls/hr IV BOLUS ONE


   Stop: 02/21/19 20:37


   Last Admin: 02/21/19 20:11 Dose:  999 mls/hr


   Documented by: 


Sodium Chloride (Nacl 0.9% 1000 Ml)  1,000 mls @ 999 mls/hr IV BOLUS ONE


   Stop: 02/21/19 20:44


Piperacillin Sod/Tazobactam Sod (Zosyn/Ns 4.5gm/100ml)  4.5 gm in 100 mls @ 200 

mls/hr IV ONCE ONE


   Stop: 02/21/19 20:39











Review of Systems


Constitutional: no weight loss, no weight gain, no fever, no chills


Ears, nose, mouth and throat: no ear pain, no ear discharge, no tinnitis, no 

decreased hearing, no nose pain


Cardiovascular: chest pain, palpitations, shortness of breath


Respiratory: no cough, no cough with sputum, no excessive sputum, no hemoptysis


Gastrointestinal: no nausea, no vomiting, no diarrhea, no constipation


Genitourinary Female: no pelvic pain, no flank pain, no menorrhagia, no dysuria,

no urinary frequency, no urgency


Rectal: no pain, no incontinence, no bleeding


Musculoskeletal: no neck stiffness, no neck pain, no shooting arm pain, no arm 

numbness/tingling, no low back pain


Integumentary: no rash, no pruritis, no redness, no sores, no wounds


Neurological: no paralysis, no weakness, no parathesias, no numbness


Psychiatric: no anxiety, no memory loss, no change in sleep habits, no sleep 

disturbances, no insomnia, no hypersomnia


Endocrine: no cold intolerance, no heat intolerance, no polyphagia, no excessive

thirst, no polydipsia


Hematologic/Lymphatic: no easy bruising, no easy bleeding


Allergic/Immunologic: no urticaria, no allergic rhinitis, no wheezing





Exam





- Constitutional


Vitals: 


                                        











Temp Pulse Resp BP Pulse Ox


 


 98.8 F   141 H  30 H  85/49   98 


 


 02/21/19 15:40  02/21/19 18:30  02/21/19 18:30  02/21/19 18:30  02/21/19 17:40











General appearance: Present: mild distress, obese





- EENT


Eyes: Present: PERRL


ENT: hearing intact, clear oral mucosa





- Neck


Neck: Present: supple, normal ROM





- Respiratory


Respiratory effort: labored


Respiratory: bilateral: diminished





- Cardiovascular


Heart Sounds: Present: S1 & S2.  Absent: rub, click





- Extremities


Extremities: pulses symmetrical, No edema


Peripheral Pulses: within normal limits





- Abdominal


General gastrointestinal: Present: soft, non-tender, non-distended, normal bowel

sounds


Female genitourinary: Present: normal





- Integumentary


Integumentary: Present: clear, warm, dry





- Musculoskeletal


Musculoskeletal: gait normal, strength equal bilaterally





- Psychiatric


Psychiatric: appropriate mood/affect, intact judgment & insight





- Neurologic


Neurologic: CNII-XII intact, moves all extremities





Results





- Labs


CBC & Chem 7: 


                                 02/22/19 03:53





                                 02/22/19 03:53


Labs: 


                              Abnormal lab results











  02/21/19 02/21/19 02/21/19 Range/Units





  10:08 10:08 16:57 


 


MCH  26 L    (28-32)  pg


 


RDW  17.6 H    (13.2-15.2)  %


 


Lymph % (Auto)  4.8 L    (13.4-35.0)  %


 


Mono % (Auto)  8.5 H    (0.0-7.3)  %


 


Lymph #  0.5 L    (1.2-5.4)  K/mm3


 


Seg Neutrophils %  84.9 H    (40.0-70.0)  %


 


Seg Neutrophils #  8.4 H    (1.8-7.7)  K/mm3


 


D-Dimer    519.87 H  (0-234)  ng/mlDDU


 


Sodium   134 L   (137-145)  mmol/L


 


Carbon Dioxide   20 L   (22-30)  mmol/L


 


BUN   21 H   (7-17)  mg/dL


 


Glucose   138 H   ()  mg/dL


 


Albumin   3.8 L   (3.9-5)  g/dL














Assessment and Plan





- Patient Problems


(1) Atrial flutter with rapid ventricular response


Current Visit: Yes   Status: Acute   


Plan to address problem: 


Admit to ICU: Amiodarone drip, heparin drip as per cardiology request, Pt 

declined synchronized cardioversion, supportive care, cardiology consulted. 





The high probability of a clinically significant, sudden or life threatening 

deterioration of the [cardiac,renal, respiratory] system(s) required my full and

direct attention, intervention and personal management. The aggregate critical 

care time was [65] minutes. This time is in addition to time spent performing 

reported procedures but includes the following: 





[x] Data Review and interpretation 





[x] Patient assessment and monitoring of vital signs 





[x] Documentation 





[x] Medication orders and management








(2) Cellulitis


Current Visit: Yes   Status: Acute   


Qualifiers: 


   Site of cellulitis of extremity: lower extremity   Laterality: left 


Plan to address problem: 


Empiric antibiotic therapy x 1 dose, wound care, CBC, supportive care, Surgery 

team consulted in ED








(3) Respiratory failure


Current Visit: Yes   Status: Acute   


Qualifiers: 


   Chronicity: acute   Respiratory failure complication: hypoxia   Qualified 

Code(s): J96.01 - Acute respiratory failure with hypoxia   


Plan to address problem: 


Supplemental oxygen, nebulizer therapy, NIPPV as clinically indicated, chest x 

ray, pulse oximetry








(4) Obesity hypoventilation syndrome


Current Visit: Yes   Status: Acute   


Plan to address problem: 


Supplemental oxygen, nebulizer therapy, NIPPV qhs, early ambulation, pulmonary 

toilet, balanced diet, increased physical activity at discharge








(5) DVT prophylaxis


Current Visit: Yes   Status: Acute   


Plan to address problem: 


SCD to BLE while in bed

## 2019-02-21 NOTE — EVENT NOTE
Date: 02/21/19


Atrial flutter with RVR.


Patint seen in ER.


Discussed with .


Spoke with patient's  at bedside.


Full consult dictated.


.


2/21/19 9:15 PM.

## 2019-02-21 NOTE — XRAY REPORT
CHEST TWO VIEWS: 02/21/19 10:22



CLINICAL: Shortness of breath.  No comparison.



FINDINGS: Somewhat vague opacities in the left upper lobe with volume 

loss and a prominent left hilum with adjacent surgical clips.  The left 

lower lobe is clear.  The right lung is normally expanded and clear.  

The heart is normal size but is shifted to the left.  Mild degenerative 

changes in the spine.  No suspicious bone lesions.



IMPRESSION: Probable chronic postsurgical changes of a left upper lobe 

after partial lobectomy.No acute pneumonia or CHF.

## 2019-02-22 LAB
BASOPHILS # (AUTO): 0 K/MM3 (ref 0–0.1)
BASOPHILS NFR BLD AUTO: 0.3 % (ref 0–1.8)
BUN SERPL-MCNC: 16 MG/DL (ref 7–17)
BUN/CREAT SERPL: 18 %
CALCIUM SERPL-MCNC: 7.3 MG/DL (ref 8.4–10.2)
EOSINOPHIL # BLD AUTO: 0.1 K/MM3 (ref 0–0.4)
EOSINOPHIL NFR BLD AUTO: 0.6 % (ref 0–4.3)
HCT VFR BLD CALC: 31 % (ref 30.3–42.9)
HEMOLYSIS INDEX: 8
HGB BLD-MCNC: 10.1 GM/DL (ref 10.1–14.3)
LYMPHOCYTES # BLD AUTO: 1.3 K/MM3 (ref 1.2–5.4)
LYMPHOCYTES NFR BLD AUTO: 12.1 % (ref 13.4–35)
MCHC RBC AUTO-ENTMCNC: 33 % (ref 30–34)
MCV RBC AUTO: 80 FL (ref 79–97)
MONOCYTES # (AUTO): 1.4 K/MM3 (ref 0–0.8)
MONOCYTES % (AUTO): 12.7 % (ref 0–7.3)
PLATELET # BLD: 351 K/MM3 (ref 140–440)
RBC # BLD AUTO: 3.9 M/MM3 (ref 3.65–5.03)

## 2019-02-22 PROCEDURE — 5A09457 ASSISTANCE WITH RESPIRATORY VENTILATION, 24-96 CONSECUTIVE HOURS, CONTINUOUS POSITIVE AIRWAY PRESSURE: ICD-10-PCS | Performed by: INTERNAL MEDICINE

## 2019-02-22 RX ADMIN — HEPARIN SODIUM SCH MLS/HR: 5000 INJECTION, SOLUTION INTRAVENOUS at 11:33

## 2019-02-22 RX ADMIN — OXYCODONE AND ACETAMINOPHEN PRN TAB: 5; 325 TABLET ORAL at 22:58

## 2019-02-22 RX ADMIN — Medication SCH ML: at 23:00

## 2019-02-22 RX ADMIN — Medication SCH ML: at 10:34

## 2019-02-22 RX ADMIN — NOREPINEPHRINE BITARTRATE ONE MLS/HR: 16 INJECTION, SOLUTION INTRAVENOUS at 12:02

## 2019-02-22 RX ADMIN — OXYCODONE AND ACETAMINOPHEN PRN TAB: 5; 325 TABLET ORAL at 16:45

## 2019-02-22 RX ADMIN — BUPROPION HYDROCHLORIDE SCH MG: 100 TABLET, FILM COATED, EXTENDED RELEASE ORAL at 11:26

## 2019-02-22 RX ADMIN — NOREPINEPHRINE BITARTRATE ONE MLS/HR: 16 INJECTION, SOLUTION INTRAVENOUS at 01:43

## 2019-02-22 RX ADMIN — OXYCODONE AND ACETAMINOPHEN PRN TAB: 5; 325 TABLET ORAL at 07:44

## 2019-02-22 RX ADMIN — NOREPINEPHRINE BITARTRATE ONE MLS/HR: 16 INJECTION, SOLUTION INTRAVENOUS at 09:19

## 2019-02-22 RX ADMIN — NOREPINEPHRINE BITARTRATE ONE MLS/HR: 16 INJECTION, SOLUTION INTRAVENOUS at 12:40

## 2019-02-22 RX ADMIN — BUPROPION HYDROCHLORIDE SCH MG: 100 TABLET, FILM COATED, EXTENDED RELEASE ORAL at 23:12

## 2019-02-22 NOTE — PROGRESS NOTE
Assessment and Plan


Assessment and plan: 


63 YO Female with MO,Obesity Hypoventilation on CPAP,  CAD S/P Stent Placement, 

HTN presents to ED for evaluation. Pt states that she has experienced shortness 

of breath, chest palpitations, and chest discomfort over the past 2 days with 

worsening symptoms over the same time frame. Pt seen and evaluated by her PCP 

and was initially treated with oral antibiotic therapy without relief of 

symptoms that were suspected secondary to a respiratory tract infection. Pt 

subsequently presents to ED for evaluation. Pt seen and evaluated in ED and 

found to have Atrial Flutter with RVR, Acute Respiratory Failure, as well as 

Left Leg Cellulitis. Pt treated with medical cardioversion with cardizem without

improvement. Cardiology consulted in ED. Pt then placed on amidarone drip and 

heparin drip as per cardiology request. Pt declined synchronized cardioversion. 

Pt admitted to ICU. Pt denies fever, chills, NVD, Trauma, BRBPR, Hemoptysis, or 

recent ill contacts. 





- Patient Problems


(1) Atrial flutter with rapid ventricular response


Current Visit: Yes   Status: Acute   


Plan to address problem: 


 Amiodarone drip, heparin drip as per cardiology request, Pt declined 

synchronized cardioversion, supportive care, cardiology consulted and input 

noted.


Resume Home Toprol xl WHEN BP PERMITS


Echo


Convert to NOAC prior to discharge 








(2) Cellulitis/lEFT LEG WOUND


Current Visit: Yes   Status: Acute   


Qualifiers: 


   Site of cellulitis of extremity: lower extremity   Laterality: left 


Plan to address problem: 


Empiric antibiotic therapy x 1 dose, wound care, CBC, supportive care, Surgery 

team consulted in ED








(3) Respiratory failure/Possible underlying Pneumonia with no clear evidence of 

sepsis


Current Visit: Yes   Status: Acute   


Qualifiers: 


   Chronicity: acute   Respiratory failure complication: hypoxia   Qualified 

Code(s): J96.01 - Acute respiratory failure with hypoxia   


Plan to address problem: 


Supplemental oxygen, nebulizer therapy, NIPPV as clinically indicated, chest x 

ray, pulse oximetry








(4) Obesity hypoventilation syndrome/Morbid obesity


Current Visit: Yes   Status: Acute   


Plan to address problem: 


Supplemental oxygen, nebulizer therapy, NIPPV qhs, early ambulation, pulmonary 

toilet, balanced diet, increased physical activity at discharge








(5) DVT prophylaxis


Current Visit: Yes   Status: Acute   


Plan to address problem: 


SCD to BLE while in bed











The high probability of a clinically significant, sudden or life threatening 

deterioration of the [cardiac,renal, respiratory] system(s) required my full and

direct attention, intervention and personal management. The aggregate critical 

care time was [65] minutes. This time is in addition to time spent performing 

reported procedures but includes the following: 





[x] Data Review and interpretation 





[x] Patient assessment and monitoring of vital signs 





[x] Documentation 





[x] Medication orders and management




















History


Interval history: 


Patient seen and examined, reports some improvement but still with sensation of 

shortness but improving. No chest pain at this time and no dizziness. 

















Hospitalist Physical





- Physical exam


Narrative exam: 


 VITAL SIGNS:  Reviewed.    


GENERAL:  The patient appeared well nourished and normally developed. OBESE. 

Vital signs as documented.


HEAD:  No signs of head trauma.


EYES:  Pupils are equal.  Extraocular motions intact.  


EARS:  Hearing grossly intact.


MOUTH:  Oropharynx is normal. 


NECK:  No adenopathy, no JVD.   


CHEST:  Chest with clear breath sounds bilaterally.  No wheezes, rales, or 

rhonchi.  


CARDIAC:  Regular rate and rhythm.  S1 and S2, without murmurs, gallops, or 

rubs.


VASCULAR:  No Edema.  Peripheral pulses normal and equal in all extremities.


ABDOMEN:  Soft, without detectable tenderness.  No sign of distention.  No   

rebound or guarding, and no masses palpated.   Bowel Sounds normal.


MUSCULOSKELETAL:  Good range of motion of all major joints. Extremities without 

clubbing, cyanosis or edema.  


NEUROLOGIC EXAM:  Alert and oriented x 3.  No focal sensory or strength 

deficits.   Speech normal.  Follows commands.


PSYCHIATRIC:  Mood normal.


SKIN:  Right neck line. Left lower ext wound-see wound care documentation














- Constitutional


Vitals: 


                                        











Temp Pulse Resp BP Pulse Ox


 


 98.8 F   77   28 H  115/52   99 


 


 02/21/19 15:40  02/22/19 15:00  02/22/19 15:00  02/22/19 15:00  02/22/19 15:00











General appearance: Present: mild distress, obese





Results





- Labs


CBC & Chem 7: 


                                 02/23/19 05:08





                                 02/23/19 05:08


Labs: 


                             Laboratory Last Values











WBC  10.9 K/mm3 (4.5-11.0)   02/22/19  03:53    


 


RBC  3.90 M/mm3 (3.65-5.03)   02/22/19  03:53    


 


Hgb  10.1 gm/dl (10.1-14.3)   02/22/19  03:53    


 


Hct  31.0 % (30.3-42.9)   02/22/19  03:53    


 


MCV  80 fl (79-97)   02/22/19  03:53    


 


MCH  26 pg (28-32)  L  02/22/19  03:53    


 


MCHC  33 % (30-34)   02/22/19  03:53    


 


RDW  18.1 % (13.2-15.2)  H  02/22/19  03:53    


 


Plt Count  351 K/mm3 (140-440)   02/22/19  03:53    


 


Lymph % (Auto)  12.1 % (13.4-35.0)  L  02/22/19  03:53    


 


Mono % (Auto)  12.7 % (0.0-7.3)  H  02/22/19  03:53    


 


Eos % (Auto)  0.6 % (0.0-4.3)   02/22/19  03:53    


 


Baso % (Auto)  0.3 % (0.0-1.8)   02/22/19  03:53    


 


Lymph #  1.3 K/mm3 (1.2-5.4)   02/22/19  03:53    


 


Mono #  1.4 K/mm3 (0.0-0.8)  H  02/22/19  03:53    


 


Eos #  0.1 K/mm3 (0.0-0.4)   02/22/19  03:53    


 


Baso #  0.0 K/mm3 (0.0-0.1)   02/22/19  03:53    


 


Seg Neutrophils %  74.3 % (40.0-70.0)  H  02/22/19  03:53    


 


Seg Neutrophils #  8.1 K/mm3 (1.8-7.7)  H  02/22/19  03:53    


 


PT  15.2 Sec. (12.2-14.9)  H  02/21/19  21:02    


 


INR  1.13  (0.87-1.13)   02/21/19  21:02    


 


APTT  29.2 Sec. (24.2-36.6)   02/21/19  21:02    


 


D-Dimer  519.87 ng/mlDDU (0-234)  H  02/21/19  16:57    


 


Heparin Anti-Xa Level  0.69 U.I./ml (0.3-0.7)   02/22/19  10:50    


 


VBG pH  7.359  (7.320-7.420)   02/21/19  17:35    


 


Sodium  134 mmol/L (137-145)  L  02/22/19  03:53    


 


Potassium  4.1 mmol/L (3.6-5.0)   02/22/19  03:53    


 


Chloride  102.6 mmol/L ()   02/22/19  03:53    


 


Carbon Dioxide  17 mmol/L (22-30)  L  02/22/19  03:53    


 


Anion Gap  19 mmol/L  02/22/19  03:53    


 


BUN  16 mg/dL (7-17)   02/22/19  03:53    


 


Creatinine  0.9 mg/dL (0.7-1.2)   02/22/19  03:53    


 


Estimated GFR  > 60 ml/min  02/22/19  03:53    


 


BUN/Creatinine Ratio  18 %  02/22/19  03:53    


 


Glucose  162 mg/dL ()  H  02/22/19  03:53    


 


Lactic Acid  1.10 mmol/L (0.7-2.0)   02/21/19  17:35    


 


Calcium  7.3 mg/dL (8.4-10.2)  L D 02/22/19  03:53    


 


Magnesium  1.90 mg/dL (1.7-2.3)   02/21/19  17:20    


 


Total Bilirubin  0.30 mg/dL (0.1-1.2)   02/21/19  10:08    


 


AST  18 units/L (5-40)   02/21/19  10:08    


 


ALT  15 units/L (7-56)   02/21/19  10:08    


 


Alkaline Phosphatase  93 units/L ()   02/21/19  10:08    


 


Troponin T  < 0.010 ng/mL (0.00-0.029)   02/21/19  15:52    


 


Total Protein  7.0 g/dL (6.3-8.2)   02/21/19  10:08    


 


Albumin  3.8 g/dL (3.9-5)  L  02/21/19  10:08    


 


Albumin/Globulin Ratio  1.2 %  02/21/19  10:08    


 


TSH  0.892 mlU/mL (0.270-4.200)   02/21/19  16:57    


 


Free T4  0.99 ng/dL (0.76-1.46)   02/21/19  16:57    


 


Urine Color  Yellow  (Yellow)   02/21/19  22:47    


 


Urine Turbidity  Clear  (Clear)   02/21/19  22:47    


 


Urine pH  5.0  (5.0-7.0)   02/21/19  22:47    


 


Ur Specific Gravity  1.054  (1.003-1.030)  H  02/21/19  22:47    


 


Urine Protein  <15 mg/dl mg/dL (Negative)   02/21/19  22:47    


 


Urine Glucose (UA)  Neg mg/dL (Negative)   02/21/19  22:47    


 


Urine Ketones  Neg mg/dL (Negative)   02/21/19  22:47    


 


Urine Blood  Neg  (Negative)   02/21/19  22:47    


 


Urine Nitrite  Neg  (Negative)   02/21/19  22:47    


 


Urine Bilirubin  Neg  (Negative)   02/21/19  22:47    


 


Urine Urobilinogen  < 2.0 mg/dL (<2.0)   02/21/19  22:47    


 


Ur Leukocyte Esterase  Neg  (Negative)   02/21/19  22:47    


 


Urine WBC (Auto)  < 1.0 /HPF (0.0-6.0)   02/21/19  22:47    


 


Urine RBC (Auto)  6.0 /HPF (0.0-6.0)   02/21/19  22:47    


 


U Epithel Cells (Auto)  < 1.0 /HPF (0-13.0)   02/21/19  22:47    


 


Urine Mucus  Few /HPF  02/21/19  22:47

## 2019-02-22 NOTE — XRAY REPORT
FINAL REPORT



EXAM:  XR CHEST 1V AP



HISTORY:  ETT placement 



TECHNIQUE:  Frontal chest radiograph. 



PRIORS:  02/21/2019.



FINDINGS:  

Right IJ central venous catheter tip projects in the lower SVC. Unchanged aortic calculi. The cardiom
ediastinal silhouette is normal. 



Worsened patchy multifocal left lung opacities. 



No pleural effusion.



No pneumothorax.



No acute osseous abnormality. 



IMPRESSION:  

Worsened left lung opacities which may represent multifocal pneumonia versus atelectasis and/or scarr
ing.

## 2019-02-22 NOTE — CONSULTATION
History of Present Illness


Consult date: 02/22/19


Chief complaint: 





LLE wound





- History of present illness


History of present illness: 





63 yo F well known to me for management of left lower extremity wound presented 

to hospital with acute onset shortness or breath and palpitations. She states 

she was not feeling well yesterday and then started to feel like she couldn't 

breathe. She was found to be in afib RVR and hypotensive upon ED evaluation. She

was treated with IV amlodipine, IVF, IV abx and further w/u. Surgery is being 

consulted to follow up on left lower extremity wound. The patient is seen in the

wound care clinic once per week. She has been progressing well. Wound cultures 

performed on 2/4 grew out MRSA which was sensitive to Bactrim and so the patient

was started on this antibiotic. She has been taking it for over one week without

issues. She denies pain in the leg, drainage or smell from the wound, abnormal 

swelling. Tm in ER was 100.5. Pt states she feels much better today.





Past History


Past Medical History: CAD, other (Obesity Hypoventilation,)


Past Surgical History: appendectomy, Other (stent placement, evacuation of left 

lower extremity hematoma)


Social history: , lives with family.  denies: smoking, alcohol abuse, 

prescription drug abuse


Family history: hypertension





Medications and Allergies


                                    Allergies











Allergy/AdvReac Type Severity Reaction Status Date / Time


 


No Known Allergies Allergy   Verified 02/21/19 09:14











                                Home Medications











 Medication  Instructions  Recorded  Confirmed  Last Taken  Type


 


Alendronate Sodium [Fosamax] 70 mg PO QWEEK 01/02/19 02/22/19 01/03/19 09:00 

History


 


Atorvastatin [Lipitor] 80 mg PO QHS 01/02/19 02/22/19 01/03/19 21:00 History


 


Citalopram Hydrobromide 20 mg PO DAILY 01/02/19 02/22/19 01/03/19 09:00 History





[Citalopram HBr]     


 


Clopidogrel Bisulfate [Clopidogrel] 75 mg PO DAILY 01/02/19 02/22/19 01/03/19 

09:00 History


 


Cyanocobalamin [Vitamin B-12] 1,000 mcg IM QMONTH 01/02/19 02/22/19 12/14/18 

History


 


Lisinopril [Zestril TAB] 40 mg PO DAILY 01/02/19 02/22/19 01/03/19 09:00 History


 


Metoprolol [Lopressor TAB] 50 mg PO DAILY 01/02/19 02/22/19 01/03/19 09:00 

History


 


buPROPion HCl [Bupropion HCl ER] 200 mg PO BID 01/02/19 02/22/19 01/03/19 21:00 

History











Active Meds: 


Active Medications





Albuterol (Proventil)  2.5 mg IH Q3HRT PRN


   PRN Reason: Shortness Of Breath


Alendronate Sodium (Fosamax)  70 mg PO QWEEK [facility]


Atorvastatin Calcium (Lipitor)  80 mg PO QHS [facility]


   Last Admin: 02/21/19 23:35 Dose:  80 mg


   Documented by: 


Bupropion HCl (Wellbutrin Sr)  200 mg PO BID [facility]


   Last Admin: 02/22/19 11:26 Dose:  200 mg


   Documented by: 


Citalopram Hydrobromide (Celexa)  20 mg PO DAILY [facility]


   Last Admin: 02/22/19 11:25 Dose:  20 mg


   Documented by: 


Cyanocobalamin (Vitamin B-12)  1,000 mcg IM QMONTH [facility]


   Last Admin: 02/21/19 23:35 Dose:  1,000 mcg


   Documented by: 


Amiodarone HCl 900 mg/ (Dextrose)  500 mls @ 33.33 mls/hr IV AS DIRECT TIAN; 

Protocol


   Last Titration: 02/22/19 00:10 Dose:  0.5 mg/min, 16.67 mls/hr


   Documented by: 


Heparin Sodium/Sodium Chloride (Heparin/ 0.45% Nacl-25,000 Unit/500 Ml)  25,000 

unit in 500 mls @ 30 mls/hr IV TITR TIAN; Protocol


   Last Admin: 02/22/19 11:33 Dose:  1,400 units/hr, 28 mls/hr


   Documented by: 


Lorazepam (Ativan)  1 mg IV Q4H PRN


   PRN Reason: Agitation


Oxycodone/Acetaminophen (Percocet 5/325)  2 tab PO Q6H PRN


   PRN Reason: Pain, Moderate (4-6)


Oxycodone/Acetaminophen (Percocet 5/325)  1 tab PO Q6H PRN


   PRN Reason: Pain, Moderate (4-6)


   Last Admin: 02/22/19 07:44 Dose:  1 tab


   Documented by: 


Sodium Chloride (Sodium Chloride Flush Syringe 10 Ml)  10 ml IV BID TIAN


   Last Admin: 02/22/19 10:34 Dose:  10 ml


   Documented by: 


Sodium Chloride (Sodium Chloride Flush Syringe 10 Ml)  10 ml IV PRN PRN


   PRN Reason: LINE FLUSH











Review of Systems


All systems: negative (10 pt ROS performed and negative except for that listed 

in HPI)





Exam


                                   Vital Signs











Temp Pulse Resp BP Pulse Ox


 


 98.8 F   51 L  22   103/76   97 


 


 02/21/19 09:56  02/21/19 09:56  02/21/19 09:56  02/21/19 09:56  02/21/19 09:56











Narrative exam: 





Gen; AAOx3. NAD


ENT: no scleral icterus or conjunctival pallor


CV: s1, s2+. sinus rhythm


Resp; even and unlabored


Ext; no c/c/e. LLE wound healing well. L lateral calf wound appears healed. L 

medial calf wound with pink base. Periwound tissue unremarkable. No drainage. No

odor. Dry dressing applied.





Results





- Labs





                                 02/22/19 03:53





                                 02/22/19 03:53


                              Abnormal lab results











  02/21/19 02/21/19 02/21/19 Range/Units





  16:57 21:02 22:47 


 


Hgb     (10.1-14.3)  gm/dl


 


MCH     (28-32)  pg


 


RDW     (13.2-15.2)  %


 


Lymph % (Auto)     (13.4-35.0)  %


 


Mono % (Auto)     (0.0-7.3)  %


 


Mono #     (0.0-0.8)  K/mm3


 


Seg Neutrophils %     (40.0-70.0)  %


 


Seg Neutrophils #     (1.8-7.7)  K/mm3


 


PT   15.2 H   (12.2-14.9)  Sec.


 


D-Dimer  519.87 H    (0-234)  ng/mlDDU


 


Heparin Anti-Xa Level     (0.3-0.7)  U.I./ml


 


Sodium     (137-145)  mmol/L


 


Carbon Dioxide     (22-30)  mmol/L


 


Glucose     ()  mg/dL


 


Calcium     (8.4-10.2)  mg/dL


 


Ur Specific Gravity    1.054 H  (1.003-1.030)  














  02/21/19 02/22/19 02/22/19 Range/Units





  23:12 03:53 03:53 


 


Hgb  9.7 L    (10.1-14.3)  gm/dl


 


MCH   26 L   (28-32)  pg


 


RDW   18.1 H   (13.2-15.2)  %


 


Lymph % (Auto)   12.1 L   (13.4-35.0)  %


 


Mono % (Auto)   12.7 H   (0.0-7.3)  %


 


Mono #   1.4 H   (0.0-0.8)  K/mm3


 


Seg Neutrophils %   74.3 H   (40.0-70.0)  %


 


Seg Neutrophils #   8.1 H   (1.8-7.7)  K/mm3


 


PT     (12.2-14.9)  Sec.


 


D-Dimer     (0-234)  ng/mlDDU


 


Heparin Anti-Xa Level     (0.3-0.7)  U.I./ml


 


Sodium    134 L  (137-145)  mmol/L


 


Carbon Dioxide    17 L  (22-30)  mmol/L


 


Glucose    162 H  ()  mg/dL


 


Calcium    7.3 L D  (8.4-10.2)  mg/dL


 


Ur Specific Gravity     (1.003-1.030)  














  02/22/19 Range/Units





  03:53 


 


Hgb   (10.1-14.3)  gm/dl


 


MCH   (28-32)  pg


 


RDW   (13.2-15.2)  %


 


Lymph % (Auto)   (13.4-35.0)  %


 


Mono % (Auto)   (0.0-7.3)  %


 


Mono #   (0.0-0.8)  K/mm3


 


Seg Neutrophils %   (40.0-70.0)  %


 


Seg Neutrophils #   (1.8-7.7)  K/mm3


 


PT   (12.2-14.9)  Sec.


 


D-Dimer   (0-234)  ng/mlDDU


 


Heparin Anti-Xa Level  0.74 H  (0.3-0.7)  U.I./ml


 


Sodium   (137-145)  mmol/L


 


Carbon Dioxide   (22-30)  mmol/L


 


Glucose   ()  mg/dL


 


Calcium   (8.4-10.2)  mg/dL


 


Ur Specific Gravity   (1.003-1.030)  








                                 Diabetes panel











  02/22/19 Range/Units





  03:53 


 


Sodium  134 L  (137-145)  mmol/L


 


Potassium  4.1  (3.6-5.0)  mmol/L


 


Chloride  102.6  ()  mmol/L


 


Carbon Dioxide  17 L  (22-30)  mmol/L


 


BUN  16  (7-17)  mg/dL


 


Creatinine  0.9  (0.7-1.2)  mg/dL


 


Glucose  162 H  ()  mg/dL


 


Calcium  7.3 L D  (8.4-10.2)  mg/dL








                                  Thyroid panel











  02/21/19 Range/Units





  16:57 


 


TSH  0.892  (0.270-4.200)  mlU/mL








                                  Calcium panel











  02/22/19 Range/Units





  03:53 


 


Calcium  7.3 L D  (8.4-10.2)  mg/dL








                                 Pituitary panel











  02/21/19 02/22/19 Range/Units





  16:57 03:53 


 


Sodium   134 L  (137-145)  mmol/L


 


Potassium   4.1  (3.6-5.0)  mmol/L


 


Chloride   102.6  ()  mmol/L


 


Carbon Dioxide   17 L  (22-30)  mmol/L


 


BUN   16  (7-17)  mg/dL


 


Creatinine   0.9  (0.7-1.2)  mg/dL


 


Glucose   162 H  ()  mg/dL


 


Calcium   7.3 L D  (8.4-10.2)  mg/dL


 


TSH  0.892   (0.270-4.200)  mlU/mL








                                  Adrenal panel











  02/22/19 Range/Units





  03:53 


 


Sodium  134 L  (137-145)  mmol/L


 


Potassium  4.1  (3.6-5.0)  mmol/L


 


Chloride  102.6  ()  mmol/L


 


Carbon Dioxide  17 L  (22-30)  mmol/L


 


BUN  16  (7-17)  mg/dL


 


Creatinine  0.9  (0.7-1.2)  mg/dL


 


Glucose  162 H  ()  mg/dL


 


Calcium  7.3 L D  (8.4-10.2)  mg/dL














Assessment and Plan





63 yo F with LLE wounds, recent cultures with MRSA





Plan:





1. continue current wound care, no need for debridement


2. wound care c/s pending


3. abx per 1' - pt on vanco and zosyn. Can be discharged on bactrim for left leg

wound as long as no contraindication per 1' service


4. Left leg elevation


5. w/u and treatment of hypotension, afib per 1' service





Thank you, please call with questions

## 2019-02-22 NOTE — PROGRESS NOTE
Assessment and Plan


Pt presented with new onset AFlutter RVR and hypotension which was likely 

precipitated by ? PNA. Pt converted to SR overnight. She remains on levophed gtt

and amio gtt. Cont amio gtt. Wean levophed as tolerated and resume home cardiac 

regimen (Toprol XL, lisinopril) once BPs permit.  


Pt has h/o CAD with PCI of LAD in 2011. Lexiscan MPI stress test done 7/2018 was

negative. Her home cardiac regimen includes ASA 81 and Plavix. She has been 

placed on heparin gtt in setting of AFlutter and will require conversion to NOAC

prior to hospital discharge. D/c Plavix and cont ASA 81. 


Obtain echo. 





The patient has been seen in conjunction with Dr. Gaona who agrees with the 

assessment and plan of care. 








- Patient Problems


(1) Atrial flutter with rapid ventricular response


Current Visit: Yes   Status: Acute   





(2) Pulmonary infiltrate


Current Visit: Yes   Status: Acute   





(3) Hypotension


Current Visit: Yes   Status: Acute   





(4) CAD (coronary artery disease)


Current Visit: Yes   Status: Chronic   





(5) Stented coronary artery


Current Visit: Yes   Status: Chronic   





(6) Leg wound, left


Current Visit: Yes   Status: Acute   





(7) Morbid obesity


Current Visit: Yes   Status: Chronic   





(8) Sleep apnea


Current Visit: Yes   Status: Chronic   





Subjective


Date of service: 02/22/19


Principal diagnosis: AFlutter


Interval history: 


pt resting in bed, states she is feeling better today. in SR on telemetry. 

remains on levophed and amio gtt. 








Objective





                                Last Vital Signs











Temp  98.8 F   02/21/19 15:40


 


Pulse  75   02/22/19 09:15


 


Resp  21   02/22/19 09:15


 


BP  132/98   02/22/19 09:15


 


Pulse Ox  98   02/22/19 09:01




















- Physical Examination


General: No Apparent Distress


HEENT: Positive: PERRL, Normocephaly, Mucus Membranes Moist


Neck: Positive: neck supple, trachea midline


Cardiac: Positive: Reg Rate and Rhythm, S1/S2


Lungs: Positive: Decreased Breath Sounds


Neuro: Positive: Grossly Intact


Abdomen: Positive: Soft.  Negative: Tender


Skin: Negative: Rash, Wound


Musculoskeletal: No Pain


Extremities: Absent: edema





- Labs and Meds


                                 Cardiac Enzymes











  02/21/19 Range/Units





  10:08 


 


AST  18  (5-40)  units/L








                                   Coagulation











  02/21/19 02/21/19 Range/Units





  16:57 21:02 


 


PT  14.0  15.2 H  (12.2-14.9)  Sec.


 


INR  1.02  1.13  (0.87-1.13)  


 


APTT  34.4  29.2  (24.2-36.6)  Sec.








                                       CBC











  02/21/19 02/21/19 02/22/19 Range/Units





  10:08 23:12 03:53 


 


WBC  9.9   10.9  (4.5-11.0)  K/mm3


 


RBC  4.70   3.90  (3.65-5.03)  M/mm3


 


Hgb  12.1  9.7 L  10.1  (10.1-14.3)  gm/dl


 


Hct  37.0  30.3  D  31.0  (30.3-42.9)  %


 


Plt Count  356  328  351  (140-440)  K/mm3


 


Lymph #  0.5 L   1.3  (1.2-5.4)  K/mm3


 


Mono #  0.8   1.4 H  (0.0-0.8)  K/mm3


 


Eos #  0.1   0.1  (0.0-0.4)  K/mm3


 


Baso #  0.1   0.0  (0.0-0.1)  K/mm3








                          Comprehensive Metabolic Panel











  02/21/19 02/22/19 Range/Units





  10:08 03:53 


 


Sodium  134 L  134 L  (137-145)  mmol/L


 


Potassium  4.4  4.1  (3.6-5.0)  mmol/L


 


Chloride  99.9  102.6  ()  mmol/L


 


Carbon Dioxide  20 L  17 L  (22-30)  mmol/L


 


BUN  21 H  16  (7-17)  mg/dL


 


Creatinine  1.0  0.9  (0.7-1.2)  mg/dL


 


Glucose  138 H  162 H  ()  mg/dL


 


Calcium  9.1  7.3 L D  (8.4-10.2)  mg/dL


 


AST  18   (5-40)  units/L


 


ALT  15   (7-56)  units/L


 


Alkaline Phosphatase  93   ()  units/L


 


Total Protein  7.0   (6.3-8.2)  g/dL


 


Albumin  3.8 L   (3.9-5)  g/dL














- Imaging and Cardiology


EKG: report reviewed, image reviewed


Echo: pending, report reviewed (7/2018: EF 55-60%)





- Telemetry


EKG Rhythm: Sinus Rhythm

## 2019-02-22 NOTE — CONSULTATION
CONSULT REQUESTED BY:  Nj Varela MD





REASON FOR CONSULTATION:  Atrial flutter with rapid ventricular rate.





HISTORY OF PRESENT ILLNESS:  This 64-year-old white female followed in our

office by Dr. Nava for coronary artery disease with stent deployment in 2011 of

the LAD 90% by Dr. Quiroga, has been doing fairly well.  She had PET scan done

in 07/2018, which was negative for ischemia.  She fell down in November and

injured her left leg with some hematoma requiring evacuation.  Since that time,

she has not been feeling well.  She has been told to take Bactrim.  She did take

Bactrim starting on last week for prevention of infection.  This morning, she

woke up with shortness of breath and palpitations.  The patient also claims she

has been having palpitations off and on since Tuesday, but she did not call the

office.





MEDICATIONS:  At home include Plavix 75 mg a day, vitamin B12 1000 microgram/d,

lisinopril 40 mg a day, metoprolol succinate 50 mg a day, bupropion  mg

b.i.d.





SOCIAL HISTORY:  The patient is .  The patient's  is at the

bedside.  She does not smoke.





REVIEW OF SYSTEMS:

ENDOCRINE:  Negative for diabetes mellitus.  No history of sleep apnea.  No

history of thyroid disease.

CARDIOPULMONARY:  No history of heart failure in the past.  No history of atrial

fibrillation and flutter in the past.  No history of pulmonary embolism.

NEUROPSYCHIATRY:  No history of TIA or stroke.

GASTROINTESTINAL:  No symptoms.

MUSCULOSKELETAL:  She has left lower leg edema and wound and packing in the

anterior wall of lower extremity.





PHYSICAL EXAMINATION:

VITAL SIGNS:  BMI is 52.9.  blood pressure has been around 100/60.  Pulse rate

140 per minute, respirations 30 per minute, temperature 100.3.

GENERAL:  The patient is in mild to moderate respiratory distress.

NECK:  She has got central line in the right external jugular vein.  Cannot make

out any JVP distention.

HEART:  PMI is not palpable.  Heart sounds heard well.  Rapid regular rhythm. 

Carotid massage did reveal the flutter waves and slow heart rate.

LUNGS:  Clear clinically.

ABDOMEN:  Obese 

EXTREMITIES:  Left lower leg is swollen and there is wound pack in the anterior

compartment of the tibial area.  Right leg, no edema.  Pulses felt well.





LABORATORY DATA AND IMAGING STUDIES:  White cell count 9900, hemoglobin 12.1

grams percent, platelet count 356.  Neutrophils 84.9, shift to the left. 

D-dimer is 519.87, normal is 0-234.  Sodium is 134, potassium 4.4.  Magnesium is

1.9.  Calcium is 9.1, albumin is 3.8.  TSH is 0.892.  Free T4 is 0.99.  CT of

the chest is negative for pulmonary embolism.  Chest x-ray revealed left upper

lobe opacity, acute versus chronic.  EKG revealed atrial flutter with rapid

ventricular rate.





IMPRESSION:

1.  Atrial flutter with rapid ventricular rate.  Etiology is unknown at this

time.

2.  Possible infection or obesity, sleep apnea are in the differential

diagnoses.

3.  History of stent deployment in LAD in 2011.

4.  Morbid obesity.





DISCUSSION:  The patient is fairly stable.  The pulse pressure is good.  She is

not in shock clinically.  I did talk to the patient and the patient's 

that cardioversion may help her; however, the duration of atrial flutter is not

known.Spoke with ER doctor .To fluid bolus 2 litres of NS.Start on ABx 
sor possible sepsis.IV digoxin 0.5 mg and IV lopressor 5 mg now.  We will start 
the patient on heparin and give her amiodarone, Lopressor

and IV fluids with echocardiogram in the morning.





DD: 02/21/2019 21:05

DT: 02/22/2019 00:29

JOB# [id number]  [id number]

LEANDRO/ROSALBA ALLRED

## 2019-02-23 LAB
ALBUMIN SERPL-MCNC: 3.2 G/DL (ref 3.9–5)
ALT SERPL-CCNC: 18 UNITS/L (ref 7–56)
BUN SERPL-MCNC: 13 MG/DL (ref 7–17)
BUN/CREAT SERPL: 16 %
CALCIUM SERPL-MCNC: 7.6 MG/DL (ref 8.4–10.2)
HCT VFR BLD CALC: 26.5 % (ref 30.3–42.9)
HEMOLYSIS INDEX: 1
HGB BLD-MCNC: 8.6 GM/DL (ref 10.1–14.3)
MCHC RBC AUTO-ENTMCNC: 33 % (ref 30–34)
MCV RBC AUTO: 79 FL (ref 79–97)
PLATELET # BLD: 296 K/MM3 (ref 140–440)
RBC # BLD AUTO: 3.36 M/MM3 (ref 3.65–5.03)

## 2019-02-23 PROCEDURE — 4A033R1 MEASUREMENT OF ARTERIAL SATURATION, PERIPHERAL, PERCUTANEOUS APPROACH: ICD-10-PCS | Performed by: INTERNAL MEDICINE

## 2019-02-23 RX ADMIN — VANCOMYCIN HYDROCHLORIDE SCH MLS/HR: 1 INJECTION, POWDER, LYOPHILIZED, FOR SOLUTION INTRAVENOUS at 14:15

## 2019-02-23 RX ADMIN — NOREPINEPHRINE BITARTRATE SCH MLS/HR: 16 INJECTION, SOLUTION INTRAVENOUS at 07:55

## 2019-02-23 RX ADMIN — HEPARIN SODIUM SCH MLS/HR: 5000 INJECTION, SOLUTION INTRAVENOUS at 16:10

## 2019-02-23 RX ADMIN — AZITHROMYCIN SCH MLS/HR: 500 INJECTION, POWDER, LYOPHILIZED, FOR SOLUTION INTRAVENOUS at 10:41

## 2019-02-23 RX ADMIN — LORAZEPAM PRN MG: 2 INJECTION INTRAMUSCULAR; INTRAVENOUS at 10:32

## 2019-02-23 RX ADMIN — OXYCODONE AND ACETAMINOPHEN PRN TAB: 5; 325 TABLET ORAL at 08:00

## 2019-02-23 RX ADMIN — BUPROPION HYDROCHLORIDE SCH MG: 100 TABLET, FILM COATED, EXTENDED RELEASE ORAL at 21:53

## 2019-02-23 RX ADMIN — NOREPINEPHRINE BITARTRATE SCH MLS/HR: 16 INJECTION, SOLUTION INTRAVENOUS at 20:35

## 2019-02-23 RX ADMIN — Medication SCH ML: at 10:34

## 2019-02-23 RX ADMIN — LORAZEPAM PRN MG: 2 INJECTION INTRAMUSCULAR; INTRAVENOUS at 19:45

## 2019-02-23 RX ADMIN — BUPROPION HYDROCHLORIDE SCH MG: 100 TABLET, FILM COATED, EXTENDED RELEASE ORAL at 10:32

## 2019-02-23 RX ADMIN — CEFTRIAXONE SODIUM SCH MLS/HR: 2 INJECTION, POWDER, FOR SOLUTION INTRAMUSCULAR; INTRAVENOUS at 10:33

## 2019-02-23 RX ADMIN — Medication SCH ML: at 21:55

## 2019-02-23 RX ADMIN — OXYCODONE AND ACETAMINOPHEN PRN TAB: 5; 325 TABLET ORAL at 19:45

## 2019-02-23 NOTE — VASCULAR LAB REPORT
FINAL REPORT



EXAM:  VL VENOUS DUPLEX LE BILAT



HISTORY:  dvt 



TECHNIQUE:  Grayscale and color and spectral Doppler ultrasound imaging of the bilateral lower extrem
ity was performed for the purposes of assessing for deep venous thrombosis. 



PRIORS:  None.



FINDINGS:  

No evidence of deep venous thrombosis is seen within the common femoral through the posterior tibial 
and peroneal veins. Normal compression and color flow is seen throughout the venous system of the augusto
ateral lower extremities. Normal augmentation.



IMPRESSION:  

Negative for bilateral lower extremity deep venous thrombosis.

## 2019-02-23 NOTE — PROGRESS NOTE
Assessment and Plan





63yo WF:





1.  afib/flutter with vvr


2.  CAP/sepsis


3.  septic shock


4.  Obesity/OHS/SILVANA


5.  LLE wound mrsa


6.  anemia


7.  protein malnutrition





critically ill


echo reviewed


I suspect hypotension is due to sepsis (ef nl)


cont iv amio/heparin


ivf


iv abx


wean love, and start ccb when bp will tolerate


d/w pt and 


Discussed with Dr. Joshua





- Patient Problems


(1) Atrial flutter with rapid ventricular response


Status: Acute   





(2) Hypotension


Status: Acute   





(3) Leg wound, left


Status: Acute   





(4) Obesity hypoventilation syndrome


Status: Acute   





(5) Pleuritic chest pain


Status: Acute   





(6) Pulmonary infiltrate


Status: Acute   





(7) CAD (coronary artery disease)


Status: Chronic   





(8) Morbid obesity


Status: Chronic   





(9) Sleep apnea


Status: Chronic   





(10) Stented coronary artery


Status: Chronic   





(11) Open wound of left lower leg with complication


Status: Acute   





Subjective


Date of service: 02/23/19


Interval history: 





feels better


 at bedside

## 2019-02-23 NOTE — CONSULTATION
History of Present Illness


Consult date: 02/23/19


Requesting physician: SOPHIA HOLMAN


Reason for consult: other (Atrial Fibrillation with RVR; Hypotension; Acute 

Hypoxemic Resp Failure)


History of present illness: 





PULMONARY/CCM CONSULT NOTE (Full dictation # [id number])





Please see dictated notes for full details





Past History


Past Medical History: CAD, other (Obesity Hypoventilation,)


Past Surgical History: appendectomy, Other (stent placement, evacuation of left 

lower extremity hematoma)


Social history: , lives with family.  denies: smoking, alcohol abuse, 

prescription drug abuse


Family history: hypertension





Medications and Allergies


                                    Allergies











Allergy/AdvReac Type Severity Reaction Status Date / Time


 


sulfamethoxazole Allergy  Nausea Verified 02/22/19 22:30





[From Bactrim]     


 


trimethoprim [From Bactrim] Allergy  Nausea Verified 02/22/19 22:30


 


adhesive tape AdvReac  Itching Verified 02/22/19 22:29











                                Home Medications











 Medication  Instructions  Recorded  Confirmed  Last Taken  Type


 


Alendronate Sodium [Fosamax] 70 mg PO QWEEK 01/02/19 02/22/19 01/03/19 09:00 

History


 


Atorvastatin [Lipitor] 80 mg PO QHS 01/02/19 02/22/19 01/03/19 21:00 History


 


Citalopram Hydrobromide 20 mg PO DAILY 01/02/19 02/22/19 01/03/19 09:00 History





[Citalopram HBr]     


 


Clopidogrel Bisulfate [Clopidogrel] 75 mg PO DAILY 01/02/19 02/22/19 01/03/19 

09:00 History


 


Cyanocobalamin [Vitamin B-12] 1,000 mcg IM QMONTH 01/02/19 02/22/19 12/14/18 

History


 


Lisinopril [Zestril TAB] 40 mg PO DAILY 01/02/19 02/22/19 01/03/19 09:00 History


 


Metoprolol [Lopressor TAB] 50 mg PO DAILY 01/02/19 02/22/19 01/03/19 09:00 

History


 


buPROPion HCl [Bupropion HCl ER] 200 mg PO BID 01/02/19 02/22/19 01/03/19 21:00 

History











Active Meds: 


Active Medications





Albuterol (Proventil)  2.5 mg IH Q3HRT PRN


   PRN Reason: Shortness Of Breath


Alendronate Sodium (Fosamax)  70 mg PO QWEEK TIAN


Atorvastatin Calcium (Lipitor)  80 mg PO QHS TIAN


   Last Admin: 02/22/19 22:59 Dose:  80 mg


   Documented by: 


Bupropion HCl (Wellbutrin Sr)  200 mg PO BID TIAN


   Last Admin: 02/23/19 10:32 Dose:  200 mg


   Documented by: 


Citalopram Hydrobromide (Celexa)  20 mg PO DAILY TIAN


   Last Admin: 02/23/19 10:32 Dose:  20 mg


   Documented by: 


Cyanocobalamin (Vitamin B-12)  1,000 mcg IM QMONTH TIAN


   Last Admin: 02/21/19 23:35 Dose:  1,000 mcg


   Documented by: 


Amiodarone HCl 900 mg/ (Dextrose)  500 mls @ 33.33 mls/hr IV AS DIRECT TIAN; 

Protocol


   Last Titration: 02/22/19 00:10 Dose:  0.5 mg/min, 16.67 mls/hr


   Documented by: 


Heparin Sodium/Sodium Chloride (Heparin/ 0.45% Nacl-25,000 Unit/500 Ml)  25,000 

unit in 500 mls @ 30 mls/hr IV TITR TIAN; Protocol


   Last Admin: 02/23/19 16:10 Dose:  1,400 units/hr, 28 mls/hr


   Documented by: 


Sodium Chloride (Nacl 0.9% 500 Ml)  500 mls @ 500 mls/hr IV AS DIRECT TIAN


   Last Admin: 02/23/19 06:50 Dose:  500 mls/hr


   Documented by: 


Norepinephrine (Levophed Drip 4 Mg/Ns 250 Ml)  4 mg in 250 mls @ 7.5 mls/hr IV 

TITR TIAN; Protocol


   Last Titration: 02/23/19 09:48 Dose:  6 mcg/min, 22.5 mls/hr


   Documented by: 


Ceftriaxone Sodium (Rocephin/Ns 2 Gm/100 Ml)  2 gm in 100 mls @ 200 mls/hr IV 

Q24HR TIAN; Protocol


   Last Admin: 02/23/19 10:33 Dose:  200 mls/hr


   Documented by: 


Azithromycin 500 mg/ Sodium (Chloride)  250 mls @ 250 mls/hr IV Q24HR TIAN; 

Protocol


   Last Admin: 02/23/19 10:41 Dose:  250 mls/hr


   Documented by: 


Vancomycin HCl (Vancomycin/Ns 1 Gm/250 Ml)  1 gm in 250 mls @ 125 mls/hr IV Q12H

TIAN


   Last Admin: 02/23/19 14:15 Dose:  125 mls/hr


   Documented by: 


Lorazepam (Ativan)  1 mg IV Q4H PRN


   PRN Reason: Agitation


   Last Admin: 02/23/19 10:32 Dose:  1 mg


   Documented by: 


Oxycodone/Acetaminophen (Percocet 5/325)  2 tab PO Q6H PRN


   PRN Reason: Pain, Moderate (4-6)


   Last Admin: 02/23/19 08:00 Dose:  2 tab


   Documented by: 


Oxycodone/Acetaminophen (Percocet 5/325)  1 tab PO Q6H PRN


   PRN Reason: Pain, Moderate (4-6)


   Last Admin: 02/22/19 07:44 Dose:  1 tab


   Documented by: 


Sodium Chloride (Sodium Chloride Flush Syringe 10 Ml)  10 ml IV BID [facility]


   Last Admin: 02/23/19 10:34 Dose:  10 ml


   Documented by: 


Sodium Chloride (Sodium Chloride Flush Syringe 10 Ml)  10 ml IV PRN PRN


   PRN Reason: LINE FLUSH











Physical Examination


Vital signs: 


                                   Vital Signs











Temp Pulse Resp BP Pulse Ox


 


 98.8 F   51 L  22   103/76   97 


 


 02/21/19 09:56  02/21/19 09:56  02/21/19 09:56  02/21/19 09:56  02/21/19 09:56














Results





- Laboratory Findings


CBC and BMP: 


                                 02/23/19 05:08





                                 02/23/19 05:08


PT/INR, D-dimer











PT  15.2 Sec. (12.2-14.9)  H  02/21/19  21:02    


 


INR  1.13  (0.87-1.13)   02/21/19  21:02    


 


D-Dimer  519.87 ng/mlDDU (0-234)  H  02/21/19  16:57    








Abnormal lab findings: 


                                  Abnormal Labs











  02/21/19 02/21/19 02/21/19





  10:08 10:08 16:57


 


RBC   


 


Hgb   


 


Hct   


 


MCH  26 L  


 


RDW  17.6 H  


 


Lymph % (Auto)  4.8 L  


 


Mono % (Auto)  8.5 H  


 


Lymph #  0.5 L  


 


Mono #   


 


Seg Neutrophils %  84.9 H  


 


Seg Neutrophils #  8.4 H  


 


PT   


 


D-Dimer    519.87 H


 


Heparin Anti-Xa Level   


 


Sodium   134 L 


 


Carbon Dioxide   20 L 


 


BUN   21 H 


 


Glucose   138 H 


 


Calcium   


 


Total Protein   


 


Albumin   3.8 L 


 


Ur Specific Gravity   














  02/21/19 02/21/19 02/21/19





  21:02 22:47 23:12


 


RBC   


 


Hgb    9.7 L


 


Hct   


 


MCH   


 


RDW   


 


Lymph % (Auto)   


 


Mono % (Auto)   


 


Lymph #   


 


Mono #   


 


Seg Neutrophils %   


 


Seg Neutrophils #   


 


PT  15.2 H  


 


D-Dimer   


 


Heparin Anti-Xa Level   


 


Sodium   


 


Carbon Dioxide   


 


BUN   


 


Glucose   


 


Calcium   


 


Total Protein   


 


Albumin   


 


Ur Specific Gravity   1.054 H 














  02/22/19 02/22/19 02/22/19





  03:53 03:53 03:53


 


RBC   


 


Hgb   


 


Hct   


 


MCH  26 L  


 


RDW  18.1 H  


 


Lymph % (Auto)  12.1 L  


 


Mono % (Auto)  12.7 H  


 


Lymph #   


 


[place] #  1.4 H  


 


Seg Neutrophils %  74.3 H  


 


Seg Neutrophils #  8.1 H  


 


PT   


 


D-Dimer   


 


Heparin Anti-Xa Level    0.74 H


 


Sodium   134 L 


 


Carbon Dioxide   17 L 


 


BUN   


 


Glucose   162 H 


 


Calcium   7.3 L D 


 


Total Protein   


 


Albumin   


 


Ur Specific Gravity   














  02/23/19 02/23/19





  05:08 05:08


 


RBC  3.36 L 


 


Hgb  8.6 L 


 


Hct  26.5 L 


 


MCH  26 L 


 


RDW  17.8 H 


 


Lymph % (Auto)  


 


Mono % (Auto)  


 


Lymph #  


 


Mono #  


 


Seg Neutrophils %  


 


Seg Neutrophils #  


 


PT  


 


D-Dimer  


 


Heparin Anti-Xa Level  


 


Sodium   134 L


 


Carbon Dioxide   20 L


 


BUN  


 


Glucose   114 H


 


Calcium   7.6 L


 


Total Protein   5.7 L


 


Albumin   3.2 L


 


Ur Specific Gravity

## 2019-02-23 NOTE — CONSULTATION
PULMONARY AND CRITICAL CARE CONSULTATION NOTE



CONSULTING PHYSICIAN:  Herb Joshua MD, emergency room doctor.



REASON FOR CONSULTATION:  Atrial fibrillation with RVR, hypotension.



CHIEF COMPLAINT AND HISTORY OF PRESENT ILLNESS:  As follows:  The patient is a

64-year-old  female with past medical history significant amongst other

things for a diagnosis of obesity with obesity hypoventilation syndrome, on CPAP

as well as coronary artery disease, came into the Emergency Room complaining of

about a couple of days of increased shortness of breath, dyspnea on exertion. 

She did admit to palpitations.  She denied any chest pain.  Because she was

having more dyspnea, her  brought her to the Emergency Room.  She had

been treated on the outpatient level with oral antibiotics for what could have

been a viral syndrome, but her symptoms persisted.  She denied any cough or

expectoration.  Denied any hemoptysis.  In the emergency room, she was found to

be in atrial flutter with a rapid ventricular response as well as an acute

hypoxemic respiratory failure.  She also has a cellulitis to the left lower

extremity that she stated it started after she fell and developed a hematoma,

but that she is not having any pain, itching or foul smelling discharge from

that area.  She admits to about a 10+ pack year tobacco smoking history, but

states she quit smoking up to 20 years ago.  Really is much of the history of

presentation as I have on her.



PAST MEDICAL HISTORY:  Again, morbid obesity, obesity hypoventilation syndrome,

coronary artery disease, hypertension.



PAST SURGICAL HISTORY:  She has had an appendectomy and she has had coronary

artery stenting.



MEDICATIONS:  She was on at the time I stopped by to see were reviewed,

pertinent medications include the following:  Albuterol 2.5 mg nebulized q. 3

hours p.r.n. shortness of breath, Fosamax 70 mg p.o. q. week, amiodarone drip

was going at I believe 1 mg per minute,  Lipitor 80 mg p.o. at bedtime,

azithromycin 500 mg IV daily, Wellbutrin 200 mg p.o. b.i.d., Rocephin 2 g IV

daily, Celexa 20 mg p.o. daily, vitamin B12 1 mg IM q. month.  She was on IV

heparin.  Ativan 1 mg IV q. 4 hours p.r.n. agitation, Levophed drip was going at

6 mcg per minute, Percocet 5/325 mg 1 tablet p.o. q. 6 hours p.r.n. moderate

pain, and vancomycin 1 gram IV q. 12 hours.



ALLERGIES:  BACTRIM AND ADHESIVE TAPE.  Nature of this allergy is unknown.



DIET:  Obese lady, denies acute weight loss or gain in the preceding few weeks

to months.



FAMILY AND SOCIAL HISTORY:  Lives in the community.  Denies current alcohol,

tobacco, or illicit drug use or abuse.  She does have a 10-pack-year remote

tobacco smoking history.  Family history is otherwise significant for

hypertension.  She is .  She lives with her .



REVIEW OF SYSTEMS:  No overt loss of consciousness.  No new onset seizures.  No

new onset focal weakness.  Denied gross hematochezia or melena.  Denied any

diarrhea, denied any nausea and vomiting.  Denied polydipsia, polyuria.  Denies

heat or cold intolerance.  Denies any new leg pain or swelling either

unilaterally or bilaterally.  Complete 13-system review of systems was obtained.

 Pertinent positives and/or negatives as in body of history above, otherwise

noncontributory.



PHYSICAL EXAMINATION:

VITAL SIGNS:  At presentation in the emergency room, she was afebrile,

temperature 98.8 degrees Fahrenheit with a pulse of 155, respiratory rate 22,

blood pressure 113/62, oxygen sats were 99%, inspired oxygen concentration was

not recorded.  T-max since she has been in the hospital is 100.5 degrees

Fahrenheit.  At the time I stopped by to see her, O2 sats were 98%; however,

that was on I believe 2 liters nasal cannula.

GENERAL:  She is an elderly looking obese  female, normocephalic,

atraumatic, talking to me in full sentences, but with mildly increased

respiratory effort at rest.

HEAD, EYES, EARS, NOSE AND THROAT:  She is anicteric.  No conjunctival erythema.

 Oropharynx is moist, is a Mallampati #4 oropharynx.  No gross jugular venous

distention.  She has a large neck circumference.  Grossly, no palpable lymph

nodes in the supraclavicular or submandibular lymph node chains.

LUNGS:  Auscultation of both lung fields significant for diminished bilateral

breath sounds, slightly prolonged expiratory phase.  No active wheezing.  Faint

inspiratory rhonchi in the bases.

HEART:  Heart sounds 1 and 2 are heard.  At the time of my evaluation, irregular

rate and rhythm and also with a rapid response at about 110 per minute.  No rubs

or murmurs.

ABDOMEN:  Soft.  It is full, protuberant.  Bowel sounds are positive, nontender.

 No palpable hepatosplenomegaly.

EXTREMITIES:  Without overt digital clubbing or cyanosis.  She has about trace

to 1+ bipedal pitting edema.  Dorsalis pedis pulses are weakly palpable

bilaterally.

NEUROLOGIC:  Pupils equal, round, about 3 mm, reactive to light.  Extraocular

muscle movements are intact.  She moves all 4 extremities spontaneously.

SKIN:  The skin is of normal turgor.  She has about a 1.5 inch healing ulcer to

the left lateral shin without tenderness, without erythema and without

exudation.



LABORATORY DATA:  From my review are as follows:  Admission white cell count

9900, hemoglobin 12.1, hematocrit 37.0, platelet count 356.  INR was 1.02. 

D-dimer was elevated at 520.  Serum sodium was 134, potassium 4.4, chloride 100,

bicarbonate 20, BUN 21, creatinine 1.0.  Glucose was 138.  Lactic acid level was

within normal limits.  Magnesium within normal limits.  Liver function tests

essentially within normal limits.  Troponin was within normal limits.  TSH and

free T4 within normal limits.  Urinalysis was unremarkable.  Most recent labs: 

Serum sodium is still 134.  Hemoglobin is 8.6.  Blood cultures and urine

cultures no growth to date.  I have reviewed the chest x-ray, no acute

infiltrates.  I do agree with chronic looking interstitial markings, possibly

postop chest x-ray, some scoliosis.  No gross pneumothorax, no gross bony

fracture.  She does have cardiomegaly.  A CT angio was also done.  I have

reviewed the radiologist's interpretation as well as reviewed the film and I do

agree well not the best contrast face timing, but no gross filling defects of

the first order vessels consistent with pulmonary emboli, some mediastinal

lipomatosis.  She probably has had a possible left upper lobe surgery.  The lung

windows do reveal some areas of scarring and bronchiectasis in the left upper

lobe region/lingular region.  No gross pneumothorax, no gross bony fractures

that I can see.



ASSESSMENT:

1.  Acute hypoxemic respiratory failure.

2.  Atrial fibrillation with rapid ventricular response, new onset.

3.  Obesity.

4.  Obesity hypoventilation syndrome, plus or minus obstructive sleep apnea.

5.  History of coronary artery disease.

6.  Hypertension.

7.  Anemia that is normocytic.

8.  Elevated D-dimer.

9.  Mild hyponatremia.

10.  Mild metabolic acidosis.

11.  Left lower extremity wound.



PLAN:  Heart rate control is ongoing.  We will defer to Cardiology.  We will

continue amiodarone.  Venous thromboembolic disorder workup will be completed

with bilateral lower extremity Dopplers.  We will continue full anticoagulation

at this time for the atrial fibrillation.  I will deploy bilevel positive air

pressure ventilation therapy at bedtime.  I do feel she likely has an element of

sleep apnea.  I will get an arterial blood gas to get some baseline numbers and

to evaluate for evidence of hypercapnia.  The wound itself does not look too

impressive to me.  We will continue broad spectrum antibiotic therapy for now. 

I will defer to the attending physician in terms of an ID consult.  In the

meantime, I will get a CRP level and along with lactic acid level, we will use

it to help guide clinical decision making/antibiotic de-escalation.  She will be

placed on GI prophylaxis, especially on full anticoagulation.  Flu and pneumonia

vaccination will be addressed per protocol.  We will wean Levophed, targeting

mean arterial pressures greater than or equal to 65 mmHg.  A 2D echocardiogram

was done.  I am able to review the report at this time, it mentions ejection

fraction 50-55% without significant diastolic dysfunction.  Normal tricuspid

valve, trace regurgitation.  No mention of right ventricular systolic pressures.

 I do feel that she may also benefit from gentle diuresis, but we will watch her

clinically for now.  Electrolytes will be followed and corrected as necessary.



Thank you very much for the consult.  We will follow along.  We will make

further recommendations as the picture progresses/becomes clearer.  I should

mention she also has a right IJ central line and central venous pressures will

be monitored as needed to help aid clinical decision making.  At this time, I

spent about 35-40 minutes of critical care time without overlap excluding any

procedural time that may be necessary.  She is critically ill on life-sustaining

interventions including the vasopressors at high risk for deterioration

including the risk of death from the cardiopulmonary system deterioration.





DD: 02/23/2019 17:04

DT: 02/23/2019 23:07

JOB# [id number]  [id number]

SUJATHA/ROSALBA ALLRED

## 2019-02-23 NOTE — PROGRESS NOTE
Assessment and Plan


Assessment and plan: 


63 YO Female with MO,Obesity Hypoventilation on CPAP,  CAD S/P Stent Placement, 

HTN presents to ED for evaluation. Pt states that she has experienced shortness 

of breath, chest palpitations, and chest discomfort over the past 2 days with 

worsening symptoms over the same time frame. Pt seen and evaluated by her PCP 

and was initially treated with oral antibiotic therapy without relief of 

symptoms that were suspected secondary to a respiratory tract infection. Pt 

subsequently presents to ED for evaluation. Pt seen and evaluated in ED and 

found to have Atrial Flutter with RVR, Acute Respiratory Failure, as well as 

Left Leg Cellulitis. Pt treated with medical cardioversion with cardizem without

improvement. Cardiology consulted in ED. Pt then placed on amidarone drip and 

heparin drip as per cardiology request. Pt declined synchronized cardioversion. 

Pt admitted to ICU. Pt denies fever, chills, NVD, Trauma, BRBPR, Hemoptysis, or 

recent ill contacts. 





Acute Respiratory failure with multifocal pneumonia


Sepsis- POA. On review of record patient had low grade fever on admission. CXR 

also shows mutifocality of infiltrate


Atrial flutter with RVR


Shock STATE. 


Bronchiectasis


Left lower ext wound with resolving cellulites- wound culture showing MRSA


Obesity Hypoventilation Syndrome


Morbid obesity


Depression








Plan


Review of xray shows Multifocal pneumonia


Doubt septic shock. Low bp Likely from aflutter


Continue abx, add Vancomycin


Surgical input noted


Continue pressors


Continue amiodarone drip and adjust with inclusion on Toprol xl when off 

pressors.


Will need NOAC on discharge per Cardiology


DVT/GI prophy








The high probability of a clinically significant, sudden or life threatening 

deterioration of the [cardiac,renal, respiratory] system(s) required my full and

direct attention, intervention and personal management. The aggregate critical 

care time was [35] minutes. This time is in addition to time spent performing 

reported procedures but includes the following: 





[x] Data Review and interpretation 





[x] Patient assessment and monitoring of vital signs 





[x] Documentation 





[x] Medication orders and management




















History


Interval history: 


Patient seen and examined, reports some improvement but still with sensation of 

shortness but improving. No chest pain at this time and no dizziness. 

















Hospitalist Physical





- Physical exam


Narrative exam: 


 VITAL SIGNS:  Reviewed.    


GENERAL:  The patient appeared well nourished and normally developed. OBESE. 

Vital signs as documented.


HEAD:  No signs of head trauma.


EYES:  Pupils are equal.  Extraocular motions intact.  


EARS:  Hearing grossly intact.


MOUTH:  Oropharynx is normal. 


NECK:  No adenopathy, no JVD.   


CHEST:  Chest with clear breath sounds bilaterally.  No wheezes, rales, or 

rhonchi.  


CARDIAC:  Regular rate and rhythm.  S1 and S2, without murmurs, gallops, or 

rubs.


VASCULAR:  No Edema.  Peripheral pulses normal and equal in all extremities.


ABDOMEN:  Soft, without detectable tenderness.  No sign of distention.  No   

rebound or guarding, and no masses palpated.   Bowel Sounds normal.


MUSCULOSKELETAL:  Good range of motion of all major joints. Extremities without 

clubbing, cyanosis or edema.  


NEUROLOGIC EXAM:  Alert and oriented x 3.  No focal sensory or strength 

deficits.   Speech normal.  Follows commands.


PSYCHIATRIC:  Mood normal.


SKIN:  Right neck line. Left lower ext wound-see wound care documentation. LLE 

wound healing well. L lateral calf wound appears healed. L medial calf wound wi

th pink base. Periwound tissue unremarkable. No drainage. No odor. Dry dressing 

applied.














- Constitutional


Vitals: 


                                        











Temp Pulse Resp BP Pulse Ox


 


 97.5 F L  112 H  17   95/30   99 


 


 02/23/19 08:00  02/23/19 07:31  02/23/19 07:31  02/23/19 07:31  02/23/19 08:36











General appearance: Present: mild distress, obese





Results





- Labs


CBC & Chem 7: 


                                 02/23/19 05:08





                                 02/23/19 05:08


Labs: 


                             Laboratory Last Values











WBC  6.7 K/mm3 (4.5-11.0)   02/23/19  05:08    


 


RBC  3.36 M/mm3 (3.65-5.03)  L  02/23/19  05:08    


 


Hgb  8.6 gm/dl (10.1-14.3)  L  02/23/19  05:08    


 


Hct  26.5 % (30.3-42.9)  L  02/23/19  05:08    


 


MCV  79 fl (79-97)   02/23/19  05:08    


 


MCH  26 pg (28-32)  L  02/23/19  05:08    


 


MCHC  33 % (30-34)   02/23/19  05:08    


 


RDW  17.8 % (13.2-15.2)  H  02/23/19  05:08    


 


Plt Count  296 K/mm3 (140-440)   02/23/19  05:08    


 


Lymph % (Auto)  12.1 % (13.4-35.0)  L  02/22/19  03:53    


 


Mono % (Auto)  12.7 % (0.0-7.3)  H  02/22/19  03:53    


 


Eos % (Auto)  0.6 % (0.0-4.3)   02/22/19  03:53    


 


Baso % (Auto)  0.3 % (0.0-1.8)   02/22/19  03:53    


 


Lymph #  1.3 K/mm3 (1.2-5.4)   02/22/19  03:53    


 


Mono #  1.4 K/mm3 (0.0-0.8)  H  02/22/19  03:53    


 


Eos #  0.1 K/mm3 (0.0-0.4)   02/22/19  03:53    


 


Baso #  0.0 K/mm3 (0.0-0.1)   02/22/19  03:53    


 


Seg Neutrophils %  74.3 % (40.0-70.0)  H  02/22/19  03:53    


 


Seg Neutrophils #  8.1 K/mm3 (1.8-7.7)  H  02/22/19  03:53    


 


PT  15.2 Sec. (12.2-14.9)  H  02/21/19  21:02    


 


INR  1.13  (0.87-1.13)   02/21/19  21:02    


 


APTT  29.2 Sec. (24.2-36.6)   02/21/19  21:02    


 


D-Dimer  519.87 ng/mlDDU (0-234)  H  02/21/19  16:57    


 


Heparin Anti-Xa Level  0.58 U.I./ml (0.3-0.7)   02/22/19  Unknown


 


VBG pH  7.359  (7.320-7.420)   02/21/19  17:35    


 


Sodium  134 mmol/L (137-145)  L  02/23/19  05:08    


 


Potassium  4.1 mmol/L (3.6-5.0)   02/23/19  05:08    


 


Chloride  104.3 mmol/L ()   02/23/19  05:08    


 


Carbon Dioxide  20 mmol/L (22-30)  L  02/23/19  05:08    


 


Anion Gap  14 mmol/L  02/23/19  05:08    


 


BUN  13 mg/dL (7-17)   02/23/19  05:08    


 


Creatinine  0.8 mg/dL (0.7-1.2)   02/23/19  05:08    


 


Estimated GFR  > 60 ml/min  02/23/19  05:08    


 


BUN/Creatinine Ratio  16 %  02/23/19  05:08    


 


Glucose  114 mg/dL ()  H  02/23/19  05:08    


 


Lactic Acid  1.10 mmol/L (0.7-2.0)   02/21/19  17:35    


 


Calcium  7.6 mg/dL (8.4-10.2)  L  02/23/19  05:08    


 


Magnesium  1.90 mg/dL (1.7-2.3)   02/21/19  17:20    


 


Total Bilirubin  0.30 mg/dL (0.1-1.2)   02/23/19  05:08    


 


AST  20 units/L (5-40)   02/23/19  05:08    


 


ALT  18 units/L (7-56)   02/23/19  05:08    


 


Alkaline Phosphatase  77 units/L ()   02/23/19  05:08    


 


Troponin T  < 0.010 ng/mL (0.00-0.029)   02/21/19  15:52    


 


Total Protein  5.7 g/dL (6.3-8.2)  L  02/23/19  05:08    


 


Albumin  3.2 g/dL (3.9-5)  L  02/23/19  05:08    


 


Albumin/Globulin Ratio  1.3 %  02/23/19  05:08    


 


TSH  0.892 mlU/mL (0.270-4.200)   02/21/19  16:57    


 


Free T4  0.99 ng/dL (0.76-1.46)   02/21/19  16:57    


 


Urine Color  Yellow  (Yellow)   02/21/19  22:47    


 


Urine Turbidity  Clear  (Clear)   02/21/19  22:47    


 


Urine pH  5.0  (5.0-7.0)   02/21/19  22:47    


 


Ur Specific Gravity  1.054  (1.003-1.030)  H  02/21/19  22:47    


 


Urine Protein  <15 mg/dl mg/dL (Negative)   02/21/19  22:47    


 


Urine Glucose (UA)  Neg mg/dL (Negative)   02/21/19  22:47    


 


Urine Ketones  Neg mg/dL (Negative)   02/21/19  22:47    


 


Urine Blood  Neg  (Negative)   02/21/19  22:47    


 


Urine Nitrite  Neg  (Negative)   02/21/19  22:47    


 


Urine Bilirubin  Neg  (Negative)   02/21/19  22:47    


 


Urine Urobilinogen  < 2.0 mg/dL (<2.0)   02/21/19  22:47    


 


Ur Leukocyte Esterase  Neg  (Negative)   02/21/19  22:47    


 


Urine WBC (Auto)  < 1.0 /HPF (0.0-6.0)   02/21/19  22:47    


 


Urine RBC (Auto)  6.0 /HPF (0.0-6.0)   02/21/19  22:47    


 


U Epithel Cells (Auto)  < 1.0 /HPF (0-13.0)   02/21/19  22:47    


 


Urine Mucus  Few /HPF  02/21/19  22:47

## 2019-02-24 RX ADMIN — ALBUTEROL SULFATE PRN MG: 2.5 SOLUTION RESPIRATORY (INHALATION) at 09:12

## 2019-02-24 RX ADMIN — BUPROPION HYDROCHLORIDE SCH MG: 100 TABLET, FILM COATED, EXTENDED RELEASE ORAL at 21:18

## 2019-02-24 RX ADMIN — LORAZEPAM PRN MG: 2 INJECTION INTRAMUSCULAR; INTRAVENOUS at 16:50

## 2019-02-24 RX ADMIN — CEFTRIAXONE SODIUM SCH MLS/HR: 2 INJECTION, POWDER, FOR SOLUTION INTRAMUSCULAR; INTRAVENOUS at 09:53

## 2019-02-24 RX ADMIN — AZITHROMYCIN SCH MLS/HR: 500 INJECTION, POWDER, LYOPHILIZED, FOR SOLUTION INTRAVENOUS at 09:53

## 2019-02-24 RX ADMIN — HEPARIN SODIUM SCH MLS/HR: 5000 INJECTION, SOLUTION INTRAVENOUS at 06:00

## 2019-02-24 RX ADMIN — VANCOMYCIN HYDROCHLORIDE SCH MLS/HR: 1 INJECTION, POWDER, LYOPHILIZED, FOR SOLUTION INTRAVENOUS at 05:39

## 2019-02-24 RX ADMIN — BUPROPION HYDROCHLORIDE SCH MG: 100 TABLET, FILM COATED, EXTENDED RELEASE ORAL at 09:53

## 2019-02-24 RX ADMIN — LORAZEPAM PRN MG: 2 INJECTION INTRAMUSCULAR; INTRAVENOUS at 05:56

## 2019-02-24 RX ADMIN — OXYCODONE AND ACETAMINOPHEN PRN TAB: 5; 325 TABLET ORAL at 09:54

## 2019-02-24 RX ADMIN — Medication SCH ML: at 21:18

## 2019-02-24 RX ADMIN — Medication SCH ML: at 09:54

## 2019-02-24 RX ADMIN — VANCOMYCIN HYDROCHLORIDE SCH MLS/HR: 1 INJECTION, POWDER, LYOPHILIZED, FOR SOLUTION INTRAVENOUS at 14:31

## 2019-02-24 NOTE — PROGRESS NOTE
Assessment and Plan


Assessment and plan: 


63 YO Female with MO,Obesity Hypoventilation on CPAP,  CAD S/P Stent Placement, 

HTN presents to ED for evaluation. Pt states that she has experienced shortness 

of breath, chest palpitations, and chest discomfort over the past 2 days with 

worsening symptoms over the same time frame. Pt seen and evaluated by her PCP 

and was initially treated with oral antibiotic therapy without relief of 

symptoms that were suspected secondary to a respiratory tract infection. Pt 

subsequently presents to ED for evaluation. Pt seen and evaluated in ED and 

found to have Atrial Flutter with RVR, Acute Respiratory Failure, as well as 

Left Leg Cellulitis. Pt treated with medical cardioversion with cardizem without

improvement. Cardiology consulted in ED. Pt then placed on amidarone drip and 

heparin drip as per cardiology request. Pt declined synchronized cardioversion. 

Pt admitted to ICU. Pt denies fever, chills, NVD, Trauma, BRBPR, Hemoptysis, or 

recent ill contacts. 





Acute Respiratory failure with multifocal pneumonia


Sepsis- POA. On review of record patient had low grade fever on admission. CXR 

also shows mutifocality of infiltrate


Atrial flutter with RVR


Shock STATE. 


Bronchiectasis


Left lower ext wound with resolving cellulites- wound culture showing MRSA


Obesity Hypoventilation Syndrome


Morbid obesity


Depression








Plan


Review of xray shows Multifocal pneumonia


Doubt septic shock. Low bp Likely from aflutter


Continue abx, add Vancomycin


Surgical input noted


Continue pressors


Continue amiodarone drip and adjust with inclusion on Toprol xl when off 

pressors.


Will need NOAC on discharge per Cardiology


DVT/GI prophy








The high probability of a clinically significant, sudden or life threatening 

deterioration of the [cardiac,renal, respiratory] system(s) required my full and

direct attention, intervention and personal management. The aggregate critical 

care time was [35] minutes. This time is in addition to time spent performing 

reported procedures but includes the following: 





[x] Data Review and interpretation 





[x] Patient assessment and monitoring of vital signs 





[x] Documentation 





[x] Medication orders and management




















History


Interval history: 


Patient seen and examined, reports some improvement but still with sensation of 

shortness but improving. No chest pain at this time and no dizziness. 

















Hospitalist Physical





- Physical exam


Narrative exam: 


 VITAL SIGNS:  Reviewed.    


GENERAL:  The patient appeared well nourished and normally developed. OBESE. 

Vital signs as documented.


HEAD:  No signs of head trauma.


EYES:  Pupils are equal.  Extraocular motions intact.  


EARS:  Hearing grossly intact.


MOUTH:  Oropharynx is normal. 


NECK:  No adenopathy, no JVD.   


CHEST:  Chest with clear breath sounds bilaterally.  No wheezes, rales, or 

rhonchi.  


CARDIAC:  Regular rate and rhythm.  S1 and S2, without murmurs, gallops, or 

rubs.


VASCULAR:  No Edema.  Peripheral pulses normal and equal in all extremities.


ABDOMEN:  Soft, without detectable tenderness.  No sign of distention.  No   

rebound or guarding, and no masses palpated.   Bowel Sounds normal.


MUSCULOSKELETAL:  Good range of motion of all major joints. Extremities without 

clubbing, cyanosis or edema.  


NEUROLOGIC EXAM:  Alert and oriented x 3.  No focal sensory or strength 

deficits.   Speech normal.  Follows commands.


PSYCHIATRIC:  Mood normal.


SKIN:  Right neck line. Left lower ext wound-see wound care documentation. LLE 

wound healing well. L lateral calf wound appears healed. L medial calf wound wi

th pink base. Periwound tissue unremarkable. No drainage. No odor. Dry dressing 

applied.














- Constitutional


Vitals: 


                                        











Temp Pulse Resp BP Pulse Ox


 


 98.1 F   92 H  18   134/61   98 


 


 02/24/19 12:00  02/24/19 12:01  02/24/19 12:01  02/24/19 12:01  02/24/19 12:01











General appearance: Present: mild distress, obese





Results





- Labs


CBC & Chem 7: 


                                 02/25/19 05:30





                                 02/23/19 05:08


Labs: 


                             Laboratory Last Values











WBC  6.7 K/mm3 (4.5-11.0)   02/23/19  05:08    


 


RBC  3.36 M/mm3 (3.65-5.03)  L  02/23/19  05:08    


 


Hgb  8.6 gm/dl (10.1-14.3)  L  02/23/19  05:08    


 


Hct  26.5 % (30.3-42.9)  L  02/23/19  05:08    


 


MCV  79 fl (79-97)   02/23/19  05:08    


 


MCH  26 pg (28-32)  L  02/23/19  05:08    


 


MCHC  33 % (30-34)   02/23/19  05:08    


 


RDW  17.8 % (13.2-15.2)  H  02/23/19  05:08    


 


Plt Count  296 K/mm3 (140-440)   02/23/19  05:08    


 


Lymph % (Auto)  12.1 % (13.4-35.0)  L  02/22/19  03:53    


 


Mono % (Auto)  12.7 % (0.0-7.3)  H  02/22/19  03:53    


 


Eos % (Auto)  0.6 % (0.0-4.3)   02/22/19  03:53    


 


Baso % (Auto)  0.3 % (0.0-1.8)   02/22/19  03:53    


 


Lymph #  1.3 K/mm3 (1.2-5.4)   02/22/19  03:53    


 


Mono #  1.4 K/mm3 (0.0-0.8)  H  02/22/19  03:53    


 


Eos #  0.1 K/mm3 (0.0-0.4)   02/22/19  03:53    


 


Baso #  0.0 K/mm3 (0.0-0.1)   02/22/19  03:53    


 


Seg Neutrophils %  74.3 % (40.0-70.0)  H  02/22/19  03:53    


 


Seg Neutrophils #  8.1 K/mm3 (1.8-7.7)  H  02/22/19  03:53    


 


PT  15.2 Sec. (12.2-14.9)  H  02/21/19  21:02    


 


INR  1.13  (0.87-1.13)   02/21/19  21:02    


 


APTT  29.2 Sec. (24.2-36.6)   02/21/19  21:02    


 


D-Dimer  519.87 ng/mlDDU (0-234)  H  02/21/19  16:57    


 


Heparin Anti-Xa Level  0.26 U.I./ml (0.3-0.7)  L  02/24/19  08:15    


 


POC ABG pH  7.322  (7.35-7.45)  L  02/23/19  17:27    


 


POC ABG pCO2  33.3  (35-45)  L  02/23/19  17:27    


 


POC ABG pO2  104  ()   02/23/19  17:27    


 


POC ABG HCO3  17.2   02/23/19  17:27    


 


POC ABG Total CO2  18   02/23/19  17:27    


 


POC ABG O2 Sat  98   02/23/19  17:27    


 


POC ABG Base Excess  -9   02/23/19  17:27    


 


VBG pH  7.359  (7.320-7.420)   02/21/19  17:35    


 


FiO2  28 %  02/23/19  17:27    


 


Sodium  134 mmol/L (137-145)  L  02/23/19  05:08    


 


Potassium  4.1 mmol/L (3.6-5.0)   02/23/19  05:08    


 


Chloride  104.3 mmol/L ()   02/23/19  05:08    


 


Carbon Dioxide  20 mmol/L (22-30)  L  02/23/19  05:08    


 


Anion Gap  14 mmol/L  02/23/19  05:08    


 


BUN  13 mg/dL (7-17)   02/23/19  05:08    


 


Creatinine  0.8 mg/dL (0.7-1.2)   02/23/19  05:08    


 


Estimated GFR  > 60 ml/min  02/23/19  05:08    


 


BUN/Creatinine Ratio  16 %  02/23/19  05:08    


 


Glucose  114 mg/dL ()  H  02/23/19  05:08    


 


Lactic Acid  1.10 mmol/L (0.7-2.0)   02/21/19  17:35    


 


Calcium  7.6 mg/dL (8.4-10.2)  L  02/23/19  05:08    


 


Magnesium  1.90 mg/dL (1.7-2.3)   02/21/19  17:20    


 


Total Bilirubin  0.30 mg/dL (0.1-1.2)   02/23/19  05:08    


 


AST  20 units/L (5-40)   02/23/19  05:08    


 


ALT  18 units/L (7-56)   02/23/19  05:08    


 


Alkaline Phosphatase  77 units/L ()   02/23/19  05:08    


 


Troponin T  < 0.010 ng/mL (0.00-0.029)   02/21/19  15:52    


 


C-Reactive Protein  16.50 mg/dL (0.00-1.30)  H  02/23/19  18:45    


 


Total Protein  5.7 g/dL (6.3-8.2)  L  02/23/19  05:08    


 


Albumin  3.2 g/dL (3.9-5)  L  02/23/19  05:08    


 


Albumin/Globulin Ratio  1.3 %  02/23/19  05:08    


 


TSH  0.892 mlU/mL (0.270-4.200)   02/21/19  16:57    


 


Free T4  0.99 ng/dL (0.76-1.46)   02/21/19  16:57    


 


Urine Color  Yellow  (Yellow)   02/21/19  22:47    


 


Urine Turbidity  Clear  (Clear)   02/21/19  22:47    


 


Urine pH  5.0  (5.0-7.0)   02/21/19  22:47    


 


Ur Specific Gravity  1.054  (1.003-1.030)  H  02/21/19  22:47    


 


Urine Protein  <15 mg/dl mg/dL (Negative)   02/21/19  22:47    


 


Urine Glucose (UA)  Neg mg/dL (Negative)   02/21/19  22:47    


 


Urine Ketones  Neg mg/dL (Negative)   02/21/19  22:47    


 


Urine Blood  Neg  (Negative)   02/21/19  22:47    


 


Urine Nitrite  Neg  (Negative)   02/21/19  22:47    


 


Urine Bilirubin  Neg  (Negative)   02/21/19  22:47    


 


Urine Urobilinogen  < 2.0 mg/dL (<2.0)   02/21/19  22:47    


 


Ur Leukocyte Esterase  Neg  (Negative)   02/21/19  22:47    


 


Urine WBC (Auto)  < 1.0 /HPF (0.0-6.0)   02/21/19  22:47    


 


Urine RBC (Auto)  6.0 /HPF (0.0-6.0)   02/21/19  22:47    


 


U Epithel Cells (Auto)  < 1.0 /HPF (0-13.0)   02/21/19  22:47    


 


Urine Mucus  Few /HPF  02/21/19  22:47

## 2019-02-24 NOTE — PROGRESS NOTE
Assessment and Plan








Acute hypoxemic respiratory failure.


Sepsis Syndrome


Atrial fibrillation with rapid ventricular response, new onset.


Obesity.


Obesity hypoventilation syndrome, plus or minus obstructive sleep apnea.


History of coronary artery disease.


Hypotension


Anemia that is normocytic.


Elevated D-dimer.


Mild hyponatremia.


Mild metabolic acidosis.


Left lower extremity wound.





- continue supplemental oxygen to keep sats > 90%


- wean off Levophed as long as MAP > 65 mmHg


- continue BIPAP qhs


- repeat CXR in am


- continue bronchodilators with pulmonary hygiene per RT


- continue empiric CAP AB's


- continue amiodarone for rate control


- cardiology evaluation ongoing


- continue glycemic control with SSI for target -180 mg/dl acutely


- tobacco abstinence counseled


- weight loss counseled


- continue full antocoagulation for A-fib


- transition to NOAC per cardiology


- GI prophylaxis


- aspiration precautions


- PT/OT


- wound care per WCT


- mobility protocol for pressure ulcer prophylaxis


- continue other care per attending /other consultants





.... care plan discussed with patient and family in room





....re-evaluate in am & prn








The high probability of a clinically significant, sudden or life threatening 

deterioration of the [cardiac,renal, respiratory] system(s) required my full and

direct attention, intervention and personal management. The aggregate critical 

care time was [35] minutes. This time is in addition to time spent performing 

reported procedures but includes the following: 





[x] Data Review and interpretation 





[x] Patient assessment and monitoring of vital signs 





[x] Documentation 





[x] Medication orders and management








Subjective


Date of service: 02/24/19


Principal diagnosis: Acute hypoxemic resp failure; AFib with RVR; Obesity; 

SILVANA/OHS; CAD.


Interval history: 





Patient is seen today for: Acute hypoxemic respiratory failure; Atrial 

fibrillation with rapid ventricular response, new onset; Obesity; Obesity 

hypoventilation syndrome, plus or minus obstructive sleep apnea; History of 

coronary artery disease.





Seen and examined at bedside; 24hour events reviewed; nursing and respiratory 

care staff consulted; no adverse overnight events reported to me; resting 

peacefully in bed; relative visiting; just about to wean off levophed; tolerated

 BIPAP overnight but ABG still not compensated; No N/V/F/C








Objective


                               Vital Signs - 12hr











  02/24/19 02/24/19 02/24/19





  06:01 06:15 06:31


 


Temperature   


 


Pulse Rate 152 H 149 H 135 H


 


Pulse Rate [   





Bilateral   





Throughout]   


 


Respiratory 15 20 22





Rate   


 


Respiratory   





Rate [Bilateral   





Throughout]   


 


Blood Pressure 161/138 161/138 126/65


 


O2 Sat by Pulse   





Oximetry   














  02/24/19 02/24/19 02/24/19





  06:45 07:00 07:15


 


Temperature   


 


Pulse Rate 136 H 90 90


 


Pulse Rate [   





Bilateral   





Throughout]   


 


Respiratory 22 21 21





Rate   


 


Respiratory   





Rate [Bilateral   





Throughout]   


 


Blood Pressure 113/70 110/67 106/70


 


O2 Sat by Pulse   98





Oximetry   














  02/24/19 02/24/19 02/24/19





  07:30 07:45 08:00


 


Temperature   98.7 F


 


Pulse Rate 93 H 87 87


 


Pulse Rate [   





Bilateral   





Throughout]   


 


Respiratory 23 21 24





Rate   


 


Respiratory   





Rate [Bilateral   





Throughout]   


 


Blood Pressure 119/48 118/58 118/58


 


O2 Sat by Pulse 98 98 97





Oximetry   














  02/24/19 02/24/19 02/24/19





  08:15 08:30 08:45


 


Temperature   


 


Pulse Rate 91 H 84 83


 


Pulse Rate [   





Bilateral   





Throughout]   


 


Respiratory 14 19 21





Rate   


 


Respiratory   





Rate [Bilateral   





Throughout]   


 


Blood Pressure 111/61 133/60 124/65


 


O2 Sat by Pulse 96 100 99





Oximetry   














  02/24/19 02/24/19 02/24/19





  09:01 09:13 09:15


 


Temperature   


 


Pulse Rate 101 H  91 H


 


Pulse Rate [  89 





Bilateral   





Throughout]   


 


Respiratory 16  19





Rate   


 


Respiratory  22 





Rate [Bilateral   





Throughout]   


 


Blood Pressure 92/67  82/37


 


O2 Sat by Pulse 98  99





Oximetry   














  02/24/19 02/24/19 02/24/19





  09:31 09:45 10:00


 


Temperature   


 


Pulse Rate 91 H 93 H 99 H


 


Pulse Rate [ 89  





Bilateral   





Throughout]   


 


Respiratory 23 16 22





Rate   


 


Respiratory 20  





Rate [Bilateral   





Throughout]   


 


Blood Pressure 114/39 114/39 106/37


 


O2 Sat by Pulse 100 99 99





Oximetry   














  02/24/19 02/24/19 02/24/19





  10:15 10:30 10:45


 


Temperature   


 


Pulse Rate 96 H 89 97 H


 


Pulse Rate [   





Bilateral   





Throughout]   


 


Respiratory 25 H 25 H 18





Rate   


 


Respiratory   





Rate [Bilateral   





Throughout]   


 


Blood Pressure 103/43 111/49 118/66


 


O2 Sat by Pulse 99 99 100





Oximetry   














  02/24/19 02/24/19 02/24/19





  11:00 11:15 11:30


 


Temperature   


 


Pulse Rate 98 H 92 H 94 H


 


Pulse Rate [   





Bilateral   





Throughout]   


 


Respiratory 26 H 19 18





Rate   


 


Respiratory   





Rate [Bilateral   





Throughout]   


 


Blood Pressure 117/75 113/63 102/66


 


O2 Sat by Pulse 99 98 98





Oximetry   














  02/24/19 02/24/19 02/24/19





  11:45 12:00 12:01


 


Temperature  98.1 F 


 


Pulse Rate 86  92 H


 


Pulse Rate [   





Bilateral   





Throughout]   


 


Respiratory 15  18





Rate   


 


Respiratory   





Rate [Bilateral   





Throughout]   


 


Blood Pressure 102/66  134/61


 


O2 Sat by Pulse 97  98





Oximetry   














  02/24/19 02/24/19 02/24/19





  13:01 14:00 15:00


 


Temperature   


 


Pulse Rate 87 89 84


 


Pulse Rate [   





Bilateral   





Throughout]   


 


Respiratory 16 19 17





Rate   


 


Respiratory   





Rate [Bilateral   





Throughout]   


 


Blood Pressure 87/36 111/48 114/45


 


O2 Sat by Pulse 99 99 100





Oximetry   














  02/24/19 02/24/19





  16:01 17:01


 


Temperature  


 


Pulse Rate 88 101 H


 


Pulse Rate [  





Bilateral  





Throughout]  


 


Respiratory 18 24





Rate  


 


Respiratory  





Rate [Bilateral  





Throughout]  


 


Blood Pressure 126/57 128/61


 


O2 Sat by Pulse 100 100





Oximetry  











Constitutional: appears uncomfortable, other (elderly looking morbidly obese CF,

normocephalic and atraumatic with mildly increased resp effort at rest)


Eyes: non-icteric


ENT: oropharynx moist, other (Mallampati 4)


Neck: supple, no lymphadenopathy, no JVD, other (no thyromegaly)


Effort: mildly labored


Ascultation: Bilateral: diminished breath sounds, rhonchi


Percussion: Bilateral: not dull


Cardiovascular: irregular rhythm


Gastrointestinal: normoactive bowel sounds, soft, non-tender, non-distended, 

other (No HSM)


Integumentary: rash


Extremities: no cyanosis, pink and warm, pulses normal, no ischemia or 

petechiae, edema (1+)


Neurologic: non-focal exam (grossly), pupils equal and round, CN II-XII normal, 

motor strength normal and


Psychiatric: mood appropriate, affect normal


CBC and BMP: 


                                 02/25/19 05:30





                                 02/23/19 05:08


ABG, PT/INR, D-dimer: 


ABG











POC ABG pH  7.322  (7.35-7.45)  L  02/23/19  17:27    


 


POC ABG pCO2  33.3  (35-45)  L  02/23/19  17:27    


 


POC ABG pO2  104  ()   02/23/19  17:27    


 


POC ABG HCO3  17.2   02/23/19  17:27    


 


POC ABG Total CO2  18   02/23/19  17:27    


 


POC ABG O2 Sat  98   02/23/19  17:27    





PT/INR, D-dimer











PT  15.2 Sec. (12.2-14.9)  H  02/21/19  21:02    


 


INR  1.13  (0.87-1.13)   02/21/19  21:02    


 


D-Dimer  519.87 ng/mlDDU (0-234)  H  02/21/19  16:57    








Abnormal lab findings: 


                                  Abnormal Labs











  02/21/19 02/21/19 02/21/19





  10:08 10:08 16:57


 


RBC   


 


Hgb   


 


Hct   


 


MCH  26 L  


 


RDW  17.6 H  


 


Lymph % (Auto)  4.8 L  


 


Mono % (Auto)  8.5 H  


 


Lymph #  0.5 L  


 


Mono #   


 


Seg Neutrophils %  84.9 H  


 


Seg Neutrophils #  8.4 H  


 


PT   


 


D-Dimer    519.87 H


 


Heparin Anti-Xa Level   


 


POC ABG pH   


 


POC ABG pCO2   


 


Sodium   134 L 


 


Carbon Dioxide   20 L 


 


BUN   21 H 


 


Glucose   138 H 


 


Calcium   


 


C-Reactive Protein   


 


Total Protein   


 


Albumin   3.8 L 


 


Ur Specific Gravity   














  02/21/19 02/21/19 02/21/19





  21:02 22:47 23:12


 


RBC   


 


Hgb    9.7 L


 


Hct   


 


MCH   


 


RDW   


 


Lymph % (Auto)   


 


Mono % (Auto)   


 


Lymph #   


 


Mono #   


 


Seg Neutrophils %   


 


Seg Neutrophils #   


 


PT  15.2 H  


 


D-Dimer   


 


Heparin Anti-Xa Level   


 


POC ABG pH   


 


POC ABG pCO2   


 


Sodium   


 


Carbon Dioxide   


 


BUN   


 


Glucose   


 


Calcium   


 


C-Reactive Protein   


 


Total Protein   


 


Albumin   


 


Ur Specific Gravity   1.054 H 














  02/22/19 02/22/19 02/22/19





  03:53 03:53 03:53


 


RBC   


 


Hgb   


 


Hct   


 


MCH  26 L  


 


RDW  18.1 H  


 


Lymph % (Auto)  12.1 L  


 


Mono % (Auto)  12.7 H  


 


Lymph #   


 


[place] #  1.4 H  


 


Seg Neutrophils %  74.3 H  


 


Seg Neutrophils #  8.1 H  


 


PT   


 


D-Dimer   


 


Heparin Anti-Xa Level    0.74 H


 


POC ABG pH   


 


POC ABG pCO2   


 


Sodium   134 L 


 


Carbon Dioxide   17 L 


 


BUN   


 


Glucose   162 H 


 


Calcium   7.3 L D 


 


C-Reactive Protein   


 


Total Protein   


 


Albumin   


 


Ur Specific Gravity   














  02/23/19 02/23/19 02/23/19





  05:08 05:08 17:27


 


RBC  3.36 L  


 


Hgb  8.6 L  


 


Hct  26.5 L  


 


MCH  26 L  


 


RDW  17.8 H  


 


Lymph % (Auto)   


 


Mono % (Auto)   


 


Lymph #   


 


Mono #   


 


Seg Neutrophils %   


 


Seg Neutrophils #   


 


PT   


 


D-Dimer   


 


Heparin Anti-Xa Level   


 


POC ABG pH    7.322 L


 


POC ABG pCO2    33.3 L


 


Sodium   134 L 


 


Carbon Dioxide   20 L 


 


BUN   


 


Glucose   114 H 


 


Calcium   7.6 L 


 


C-Reactive Protein   


 


Total Protein   5.7 L 


 


Albumin   3.2 L 


 


Ur Specific Gravity   














  02/23/19 02/24/19





  18:45 08:15


 


RBC  


 


Hgb  


 


Hct  


 


MCH  


 


RDW  


 


Lymph % (Auto)  


 


Mono % (Auto)  


 


Lymph #  


 


Mono #  


 


Seg Neutrophils %  


 


Seg Neutrophils #  


 


PT  


 


D-Dimer  


 


Heparin Anti-Xa Level   0.26 L


 


POC ABG pH  


 


POC ABG pCO2  


 


Sodium  


 


Carbon Dioxide  


 


BUN  


 


Glucose  


 


Calcium  


 


C-Reactive Protein  16.50 H 


 


Total Protein  


 


Albumin  


 


Ur Specific [place]  











Chest x-ray: image reviewed (post-op findings with reduced left lung volume and 

LLL scarring / infiltrate)


Allied health notes reviewed: nursing

## 2019-02-24 NOTE — PROGRESS NOTE
Assessment and Plan








65yo WF:





1. Atrial flutter/fib with rvr --> cvr --> sr


2. Subacute hypoxemic respiratory failure


* Multifoccal CAP


* SILVANA/OHA


3. Septic shock


* now off pressors


4. LLE wound with resolving cellulites- wound culture showing MRSA


5. Morbid obesity


6. Anemia 


7. Depression





Clinically improving


cpm








Subjective


Date of service: 02/24/19


Principal diagnosis: AFlutter


Interval history: 





feels better


 at bedside





Objective


                                   Vital Signs











  Temp Pulse Pulse Pulse Resp Resp BP


 


 02/24/19 11:00   98 H    26 H   117/75


 


 02/24/19 10:45   97 H    18   118/66


 


 02/24/19 10:30   89    25 H   111/49


 


 02/24/19 10:15   96 H    25 H   103/43


 


 02/24/19 10:00   99 H    22   106/37


 


 02/24/19 09:45   93 H    16   114/39


 


 02/24/19 09:31   91 H  89   23  20  114/39


 


 02/24/19 09:15   91 H    19   82/37


 


 02/24/19 09:13    89    22 


 


 02/24/19 09:01   101 H    16   92/67


 


 02/24/19 08:45   83    21   124/65


 


 02/24/19 08:30   84    19   133/60


 


 02/24/19 08:15   91 H    14   111/61


 


 02/24/19 08:00  98.7 F  87    24   118/58


 


 02/24/19 07:45   87    21   118/58


 


 02/24/19 07:30   93 H    23   119/48


 


 02/24/19 07:15   90    21   106/70


 


 02/24/19 07:00   90    21   110/67


 


 02/24/19 06:45   136 H    22   113/70


 


 02/24/19 06:31   135 H    22   126/65


 


 02/24/19 06:15   149 H    20   161/138


 


 02/24/19 06:01   152 H    15   161/138


 


 02/24/19 05:45   149 H    31 H   103/70


 


 02/24/19 05:31   121 H    22   103/70


 


 02/24/19 05:15   143 H    20   115/74


 


 02/24/19 05:00   142 H    22   120/74


 


 02/24/19 04:45   130 H    21   122/77


 


 02/24/19 04:31   138 H    21   125/71


 


 02/24/19 04:15   137 H    19   138/76


 


 02/24/19 04:01   136 H    20   138/76


 


 02/24/19 04:00     127 H  21  


 


 02/24/19 03:45   97 H    22   144/68


 


 02/24/19 03:33  99.0 F      


 


 02/24/19 03:30   91 H    21   138/66


 


 02/24/19 03:15   92 H    22   148/64


 


 02/24/19 03:00   92 H    25 H   136/73


 


 02/24/19 02:45   94 H    15   126/76


 


 02/24/19 02:31   90    21   135/61


 


 02/24/19 02:15   91 H    24   128/59


 


 02/24/19 02:01   131 H    21   103/38


 


 02/24/19 01:45   141 H    22   115/67


 


 02/24/19 01:31   134 H    23   115/67


 


 02/24/19 01:15   135 H    26 H   113/59


 


 02/24/19 01:00   145 H    21   113/59


 


 02/24/19 00:45   133 H    20   111/60


 


 02/24/19 00:30   134 H    19   103/61


 


 02/24/19 00:15   145 H    20   109/65


 


 02/24/19 00:11   146 H    19   106/37


 


 02/24/19 00:00   147 H   132 H  22   110/53


 


 02/23/19 23:50   146 H    22   106/37


 


 02/23/19 23:45   146 H    21   106/37


 


 02/23/19 23:31   145 H    24   97/37


 


 02/23/19 23:15   156 H    31 H   109/67


 


 02/23/19 23:01   144 H    22   94/57


 


 02/23/19 23:00   137 H    19   100/79


 


 02/23/19 22:45   131 H    24   110/66


 


 02/23/19 22:31   133 H    22   109/67


 


 02/23/19 22:15   123 H    24   130/70


 


 02/23/19 22:01   144 H    28 H   130/70


 


 02/23/19 22:00   140 H     


 


 02/23/19 21:45   104 H    19   136/61


 


 02/23/19 21:31   144 H    27 H   136/61


 


 02/23/19 21:15   129 H    25 H   99/51


 


 02/23/19 21:00   143 H    18   126/67


 


 02/23/19 20:45   142 H    19   99/51


 


 02/23/19 20:31   144 H    19   113/61


 


 02/23/19 20:15   140 H    22   113/61


 


 02/23/19 20:11   139 H    30 H   113/61


 


 02/23/19 20:01   140 H    30 H   130/91


 


 02/23/19 20:00  99.1 F     22  


 


 02/23/19 19:55       


 


 02/23/19 19:51   139 H    27 H   113/61


 


 02/23/19 19:41   140 H    23   113/61


 


 02/23/19 19:30   143 H    24   113/61


 


 02/23/19 19:21   143 H    14  


 


 02/23/19 19:10   137 H    32 H   117/75


 


 02/23/19 19:01   138 H    36 H   117/75


 


 02/23/19 18:51   142 H    33 H   121/68


 


 02/23/19 18:41   141 H    43 H   121/68


 


 02/23/19 18:30   142 H    28 H   121/68


 


 02/23/19 18:21   138 H    27 H   115/59


 


 02/23/19 18:11   132 H    30 H   115/59


 


 02/23/19 18:01   131 H    26 H   115/59


 


 02/23/19 18:00       


 


 02/23/19 17:51   135 H    17   98/59


 


 02/23/19 17:41   122 H    16   98/59


 


 02/23/19 17:30   151 H    24   98/59


 


 02/23/19 17:21   123 H    31 H   120/55


 


 02/23/19 17:11   117 H    25 H   120/55


 


 02/23/19 17:01   122 H    29 H   120/55


 


 02/23/19 16:51   121 H    29 H   116/49


 


 02/23/19 16:41   119 H    22   116/49


 


 02/23/19 16:30   102 H    22   116/49


 


 02/23/19 16:21   123 H    22   103/65


 


 02/23/19 16:11   139 H    26 H   103/65


 


 02/23/19 16:01   133 H    24   113/52


 


 02/23/19 16:00  97.8 F      


 


 02/23/19 15:51   116 H    27 H   130/53


 


 02/23/19 15:41   105 H    19   130/53


 


 02/23/19 15:30   123 H    33 H   112/64


 


 02/23/19 15:21   109 H    14   112/64


 


 02/23/19 15:11   134 H    15   112/64


 


 02/23/19 15:01   138 H    25 H   103/65


 


 02/23/19 14:51   123 H    26 H   112/64


 


 02/23/19 14:41   137 H    34 H   112/64


 


 02/23/19 14:30   132 H    25 H   112/64


 


 02/23/19 14:21   129 H    22   119/54


 


 02/23/19 14:11   113 H    23   119/54


 


 02/23/19 14:01   101 H    27 H   119/54


 


 02/23/19 14:00       


 


 02/23/19 13:51   99 H    24   117/42


 


 02/23/19 13:41   112 H    31 H   117/42


 


 02/23/19 13:31   106 H    22   117/42


 


 02/23/19 13:21   123 H    23   129/61


 


 02/23/19 13:11   139 H    25 H   129/61


 


 02/23/19 13:01   139 H    25 H   129/61


 


 02/23/19 12:51   139 H    29 H   107/47


 


 02/23/19 12:41   138 H    23   107/47


 


 02/23/19 12:34   144 H      107/47


 


 02/23/19 12:31   141 H    22   107/47


 


 02/23/19 12:21   140 H    18   106/46


 


 02/23/19 12:11   139 H    19   106/46


 


 02/23/19 12:00  98.1 F  140 H    21   106/46


 


 02/23/19 11:51   140 H    20   105/51


 


 02/23/19 11:41   142 H    22   105/51














  Pulse Ox


 


 02/24/19 11:00  99


 


 02/24/19 10:45  100


 


 02/24/19 10:30  99


 


 02/24/19 10:15  99


 


 02/24/19 10:00  99


 


 02/24/19 09:45  99


 


 02/24/19 09:31  100


 


 02/24/19 09:15  99


 


 02/24/19 09:13 


 


 02/24/19 09:01  98


 


 02/24/19 08:45  99


 


 02/24/19 08:30  100


 


 02/24/19 08:15  96


 


 02/24/19 08:00  97


 


 02/24/19 07:45  98


 


 02/24/19 07:30  98


 


 02/24/19 07:15  98


 


 02/24/19 07:00 


 


 02/24/19 06:45 


 


 02/24/19 06:31 


 


 02/24/19 06:15 


 


 02/24/19 06:01 


 


 02/24/19 05:45 


 


 02/24/19 05:31 


 


 02/24/19 05:15 


 


 02/24/19 05:00 


 


 02/24/19 04:45 


 


 02/24/19 04:31 


 


 02/24/19 04:15 


 


 02/24/19 04:01 


 


 02/24/19 04:00  99


 


 02/24/19 03:45 


 


 02/24/19 03:33 


 


 02/24/19 03:30 


 


 02/24/19 03:15 


 


 02/24/19 03:00 


 


 02/24/19 02:45 


 


 02/24/19 02:31 


 


 02/24/19 02:15 


 


 02/24/19 02:01  99


 


 02/24/19 01:45  99


 


 02/24/19 01:31  99


 


 02/24/19 01:15  98


 


 02/24/19 01:00  97


 


 02/24/19 00:45  98


 


 02/24/19 00:30  98


 


 02/24/19 00:15  98


 


 02/24/19 00:11  98


 


 02/24/19 00:00  98


 


 02/23/19 23:50  99


 


 02/23/19 23:45  98


 


 02/23/19 23:31  99


 


 02/23/19 23:15  97


 


 02/23/19 23:01  99


 


 02/23/19 23:00  99


 


 02/23/19 22:45  99


 


 02/23/19 22:31  98


 


 02/23/19 22:15  98


 


 02/23/19 22:01  100


 


 02/23/19 22:00 


 


 02/23/19 21:45  99


 


 02/23/19 21:31  98


 


 02/23/19 21:15  97


 


 02/23/19 21:00  99


 


 02/23/19 20:45  100


 


 02/23/19 20:31  99


 


 02/23/19 20:15  99


 


 02/23/19 20:11  100


 


 02/23/19 20:01  100


 


 02/23/19 20:00  99


 


 02/23/19 19:55  100


 


 02/23/19 19:51  100


 


 02/23/19 19:41  100


 


 02/23/19 19:30  100


 


 02/23/19 19:21  99


 


 02/23/19 19:10  98


 


 02/23/19 19:01  100


 


 02/23/19 18:51  98


 


 02/23/19 18:41  95


 


 02/23/19 18:30  99


 


 02/23/19 18:21  100


 


 02/23/19 18:11  100


 


 02/23/19 18:01  99


 


 02/23/19 18:00  100


 


 02/23/19 17:51  100


 


 02/23/19 17:41  97


 


 02/23/19 17:30  99


 


 02/23/19 17:21  98


 


 02/23/19 17:11  100


 


 02/23/19 17:01  100


 


 02/23/19 16:51  99


 


 02/23/19 16:41  100


 


 02/23/19 16:30  99


 


 02/23/19 16:21  99


 


 02/23/19 16:11  99


 


 02/23/19 16:01  100


 


 02/23/19 16:00  100


 


 02/23/19 15:51  100


 


 02/23/19 15:41  100


 


 02/23/19 15:30  92


 


 02/23/19 15:21  99


 


 02/23/19 15:11  99


 


 02/23/19 15:01  97


 


 02/23/19 14:51  98


 


 02/23/19 14:41  97


 


 02/23/19 14:30  96


 


 02/23/19 14:21  98


 


 02/23/19 14:11  96


 


 02/23/19 14:01  97


 


 02/23/19 14:00  100


 


 02/23/19 13:51  96


 


 02/23/19 13:41  97


 


 02/23/19 13:31  97


 


 02/23/19 13:21  97


 


 02/23/19 13:11  97


 


 02/23/19 13:01  97


 


 02/23/19 12:51  96


 


 02/23/19 12:41  94


 


 02/23/19 12:34 


 


 02/23/19 12:31  95


 


 02/23/19 12:21  96


 


 02/23/19 12:11  95


 


 02/23/19 12:00  96


 


 02/23/19 11:51  96


 


 02/23/19 11:41  95














- Physical Examination


General: No Apparent Distress


HEENT: Positive: PERRL, Normocephaly, Mucus Membranes Moist


Neck: Positive: neck supple, trachea midline


Neuro: Positive: Grossly Intact


Abdomen: Positive: Soft.  Negative: Tender


Skin: Negative: Rash, Wound


Musculoskeletal: No Pain


Extremities: Absent: edema





- Imaging and Cardiology


EKG: report reviewed, image reviewed


Echo: pending, report reviewed (7/2018: EF 55-60%)

## 2019-02-25 LAB
HCT VFR BLD CALC: 26.4 % (ref 30.3–42.9)
HGB BLD-MCNC: 8.5 GM/DL (ref 10.1–14.3)
PLATELET # BLD: 331 K/MM3 (ref 140–440)

## 2019-02-25 RX ADMIN — HEPARIN SODIUM SCH MLS/HR: 5000 INJECTION, SOLUTION INTRAVENOUS at 01:04

## 2019-02-25 RX ADMIN — AZITHROMYCIN SCH MLS/HR: 500 INJECTION, POWDER, LYOPHILIZED, FOR SOLUTION INTRAVENOUS at 09:42

## 2019-02-25 RX ADMIN — LORAZEPAM PRN MG: 2 INJECTION INTRAMUSCULAR; INTRAVENOUS at 05:44

## 2019-02-25 RX ADMIN — FAMOTIDINE SCH MG: 20 TABLET ORAL at 22:39

## 2019-02-25 RX ADMIN — FAMOTIDINE SCH MG: 20 TABLET ORAL at 13:03

## 2019-02-25 RX ADMIN — CEFTRIAXONE SODIUM SCH MLS/HR: 2 INJECTION, POWDER, FOR SOLUTION INTRAMUSCULAR; INTRAVENOUS at 11:27

## 2019-02-25 RX ADMIN — ALBUTEROL SULFATE PRN MG: 2.5 SOLUTION RESPIRATORY (INHALATION) at 07:57

## 2019-02-25 RX ADMIN — BUPROPION HYDROCHLORIDE SCH MG: 100 TABLET, FILM COATED, EXTENDED RELEASE ORAL at 09:41

## 2019-02-25 RX ADMIN — OXYCODONE AND ACETAMINOPHEN PRN TAB: 5; 325 TABLET ORAL at 22:40

## 2019-02-25 RX ADMIN — OXYCODONE AND ACETAMINOPHEN PRN TAB: 5; 325 TABLET ORAL at 15:39

## 2019-02-25 RX ADMIN — VANCOMYCIN HYDROCHLORIDE SCH MLS/HR: 1 INJECTION, POWDER, LYOPHILIZED, FOR SOLUTION INTRAVENOUS at 01:08

## 2019-02-25 RX ADMIN — AMIODARONE HYDROCHLORIDE SCH MLS/HR: 50 INJECTION, SOLUTION INTRAVENOUS at 06:50

## 2019-02-25 RX ADMIN — OXYCODONE AND ACETAMINOPHEN PRN TAB: 5; 325 TABLET ORAL at 09:55

## 2019-02-25 RX ADMIN — BUPROPION HYDROCHLORIDE SCH MG: 100 TABLET, FILM COATED, EXTENDED RELEASE ORAL at 22:39

## 2019-02-25 RX ADMIN — Medication SCH ML: at 09:42

## 2019-02-25 RX ADMIN — APIXABAN SCH MG: 5 TABLET, FILM COATED ORAL at 22:39

## 2019-02-25 RX ADMIN — METOPROLOL TARTRATE SCH MG: 50 TABLET, FILM COATED ORAL at 22:41

## 2019-02-25 RX ADMIN — AMIODARONE HYDROCHLORIDE SCH MLS/HR: 50 INJECTION, SOLUTION INTRAVENOUS at 20:38

## 2019-02-25 NOTE — PROGRESS NOTE
Assessment and Plan








Acute hypoxemic respiratory failure.


Sepsis Syndrome


Atrial fibrillation with rapid ventricular response, new onset.


Obesity.


Obesity hypoventilation syndrome, plus or minus obstructive sleep apnea.


History of coronary artery disease.


Hypotension


Anemia that is normocytic.


Elevated D-dimer.


Mild hyponatremia.


Mild metabolic acidosis.


Left lower extremity wound.





- continue supplemental oxygen to keep sats > 90%


- continue to wean Levophed as long as MAP > 65 mmHg


- continue BIPAP qhs


- repeat CXR in am


- continue bronchodilators with pulmonary hygiene per RT


- continue empiric CAP AB's


- continue amiodarone for rate control


- cardiology evaluation ongoing


- continue glycemic control with SSI for target -180 mg/dl acutely


- tobacco abstinence counseled


- weight loss counseled


- continue full antocoagulation for A-fib


- transition to NOAC per cardiology


- negative VTE w/up


- GI prophylaxis


- aspiration precautions


- PT/OT


- wound care per WCT


- mobility protocol for pressure ulcer prophylaxis


- continue other care per attending /other consultants





.... care plan discussed with patient and family in room





....re-evaluate in am & prn








The high probability of a clinically significant, sudden or life threatening 

deterioration of the [cardiac,renal, respiratory] system(s) required my full and

direct attention, intervention and personal management. The aggregate critical 

care time was [35] minutes. This time is in addition to time spent performing 

reported procedures but includes the following: 





[x] Data Review and interpretation 





[x] Patient assessment and monitoring of vital signs 





[x] Documentation 





[x] Medication orders and management











Subjective


Date of service: 02/25/19


Principal diagnosis: Acute hypoxemic resp failure; AFib with RVR; Obesity; 

SILVANA/OHS; CAD.


Interval history: 





Patient is seen today for: Acute hypoxemic respiratory failure; Atrial 

fibrillation with rapid ventricular response, new onset; Obesity; Obesity 

hypoventilation syndrome, plus or minus obstructive sleep apnea; History of 

coronary artery disease.





Seen and examined at bedside; 24hour events reviewed; nursing and respiratory 

care staff consulted; no adverse overnight events reported to me; resting 

peacefully in bed; no N/V/F/C





Objective


                               Vital Signs - 12hr











  02/25/19 02/25/19 02/25/19





  04:00 05:00 06:01


 


Temperature 98.9 F  


 


Pulse Rate 91 H 115 H 137 H


 


Pulse Rate [   





Bilateral   





Throughout]   


 


Pulse Rate [   





From Monitor]   


 


Respiratory 18 20 23





Rate   


 


Respiratory   





Rate [Bilateral   





Throughout]   


 


Blood Pressure 124/52 104/66 120/75


 


O2 Sat by Pulse 95 96 97





Oximetry   














  02/25/19 02/25/19 02/25/19





  07:01 07:56 07:58


 


Temperature   


 


Pulse Rate 116 H  


 


Pulse Rate [   124 H





Bilateral   





Throughout]   


 


Pulse Rate [   





From Monitor]   


 


Respiratory 21  





Rate   


 


Respiratory   22





Rate [Bilateral   





Throughout]   


 


Blood Pressure 138/81  


 


O2 Sat by Pulse 98 99 





Oximetry   














  02/25/19 02/25/19 02/25/19





  08:00 08:07 09:01


 


Temperature 96.8 F L  


 


Pulse Rate 119 H  124 H


 


Pulse Rate [  121 H 





Bilateral   





Throughout]   


 


Pulse Rate [ 134 H  





From Monitor]   


 


Respiratory 19  20





Rate   


 


Respiratory  20 





Rate [Bilateral   





Throughout]   


 


Blood Pressure 136/66  128/89


 


O2 Sat by Pulse 100  99





Oximetry   














  02/25/19 02/25/19 02/25/19





  09:45 09:55 10:00


 


Temperature   


 


Pulse Rate 145 H  140 H


 


Pulse Rate [   





Bilateral   





Throughout]   


 


Pulse Rate [   





From Monitor]   


 


Respiratory  13 





Rate   


 


Respiratory   





Rate [Bilateral   





Throughout]   


 


Blood Pressure 146/89  


 


O2 Sat by Pulse   





Oximetry   














  02/25/19 02/25/19 02/25/19





  10:01 11:01 12:00


 


Temperature   97.9 F


 


Pulse Rate 129 H 138 H 


 


Pulse Rate [   





Bilateral   





Throughout]   


 


Pulse Rate [   





From Monitor]   


 


Respiratory 23 17 





Rate   


 


Respiratory   





Rate [Bilateral   





Throughout]   


 


Blood Pressure 136/67 114/76 


 


O2 Sat by Pulse 97 97 





Oximetry   














  02/25/19 02/25/19 02/25/19





  12:01 13:00 14:01


 


Temperature   


 


Pulse Rate 96 H 130 H 117 H


 


Pulse Rate [   





Bilateral   





Throughout]   


 


Pulse Rate [   





From Monitor]   


 


Respiratory 20 22 22





Rate   


 


Respiratory   





Rate [Bilateral   





Throughout]   


 


Blood Pressure 100/48 107/68 115/83


 


O2 Sat by Pulse 98 98 99





Oximetry   











Constitutional: appears uncomfortable, other (elderly looking morbidly obese CF,

normocephalic and atraumatic with mildly increased resp effort at rest)


Eyes: non-icteric


ENT: oropharynx moist, other (Mallampati 4)


Neck: supple, no lymphadenopathy, no JVD, other (no thyromegaly)


Effort: mildly labored


Ascultation: Bilateral: diminished breath sounds, rhonchi


Percussion: Bilateral: not dull


Cardiovascular: irregular rhythm


Gastrointestinal: normoactive bowel sounds, soft, non-tender, non-distended, 

other (No HSM)


Integumentary: rash


Extremities: no cyanosis, pink and warm, pulses normal, no ischemia or 

petechiae, edema (1+)


Neurologic: non-focal exam (grossly), pupils equal and round, CN II-XII normal, 

motor strength normal and


Psychiatric: mood appropriate, affect normal


CBC and BMP: 


                                 02/27/19 04:55





                                 02/27/19 04:55


ABG, PT/INR, D-dimer: 


ABG











POC ABG pH  7.322  (7.35-7.45)  L  02/23/19  17:27    


 


POC ABG pCO2  33.3  (35-45)  L  02/23/19  17:27    


 


POC ABG pO2  104  ()   02/23/19  17:27    


 


POC ABG HCO3  17.2   02/23/19  17:27    


 


POC ABG Total CO2  18   02/23/19  17:27    


 


POC ABG O2 Sat  98   02/23/19  17:27    





PT/INR, D-dimer











PT  15.2 Sec. (12.2-14.9)  H  02/21/19  21:02    


 


INR  1.13  (0.87-1.13)   02/21/19  21:02    


 


D-Dimer  519.87 ng/mlDDU (0-234)  H  02/21/19  16:57    








Abnormal lab findings: 


                                  Abnormal Labs











  02/21/19 02/21/19 02/21/19





  10:08 10:08 16:57


 


RBC   


 


Hgb   


 


Hct   


 


MCH  26 L  


 


RDW  17.6 H  


 


Lymph % (Auto)  4.8 L  


 


Mono % (Auto)  8.5 H  


 


Lymph #  0.5 L  


 


Mono #   


 


Seg Neutrophils %  84.9 H  


 


Seg Neutrophils #  8.4 H  


 


PT   


 


D-Dimer    519.87 H


 


Heparin Anti-Xa Level   


 


POC ABG pH   


 


POC ABG pCO2   


 


Sodium   134 L 


 


Carbon Dioxide   20 L 


 


BUN   21 H 


 


Glucose   138 H 


 


Calcium   


 


C-Reactive Protein   


 


Total Protein   


 


Albumin   3.8 L 


 


Ur Specific Gravity   














  02/21/19 02/21/19 02/21/19





  21:02 22:47 23:12


 


RBC   


 


Hgb    9.7 L


 


Hct   


 


MCH   


 


RDW   


 


Lymph % (Auto)   


 


Mono % (Auto)   


 


Lymph #   


 


Mono #   


 


Seg Neutrophils %   


 


Seg Neutrophils #   


 


PT  15.2 H  


 


D-Dimer   


 


Heparin Anti-Xa Level   


 


POC ABG pH   


 


POC ABG pCO2   


 


Sodium   


 


Carbon Dioxide   


 


BUN   


 


Glucose   


 


Calcium   


 


C-Reactive Protein   


 


Total Protein   


 


Albumin   


 


Ur Specific Gravity   1.054 H 














  02/22/19 02/22/19 02/22/19





  03:53 03:53 03:53


 


RBC   


 


Hgb   


 


Hct   


 


MCH  26 L  


 


RDW  18.1 H  


 


Lymph % (Auto)  12.1 L  


 


Mono % (Auto)  12.7 H  


 


Lymph #   


 


Hart #  1.4 H  


 


Seg Neutrophils %  74.3 H  


 


Seg Neutrophils #  8.1 H  


 


PT   


 


D-Dimer   


 


Heparin Anti-Xa Level    0.74 H


 


POC ABG pH   


 


POC ABG pCO2   


 


Sodium   134 L 


 


Carbon Dioxide   17 L 


 


BUN   


 


Glucose   162 H 


 


Calcium   7.3 L D 


 


C-Reactive Protein   


 


Total Protein   


 


Albumin   


 


Ur Specific Gravity   














  02/23/19 02/23/19 02/23/19





  05:08 05:08 17:27


 


RBC  3.36 L  


 


Hgb  8.6 L  


 


Hct  26.5 L  


 


MCH  26 L  


 


RDW  17.8 H  


 


Lymph % (Auto)   


 


Mono % (Auto)   


 


Lymph #   


 


Mono #   


 


Seg Neutrophils %   


 


Seg Neutrophils #   


 


PT   


 


D-Dimer   


 


Heparin Anti-Xa Level   


 


POC ABG pH    7.322 L


 


POC ABG pCO2    33.3 L


 


Sodium   134 L 


 


Carbon Dioxide   20 L 


 


BUN   


 


Glucose   114 H 


 


Calcium   7.6 L 


 


C-Reactive Protein   


 


Total Protein   5.7 L 


 


Albumin   3.2 L 


 


Ur Specific Gravity   














  02/23/19 02/24/19 02/24/19





  18:45 08:15 17:25


 


RBC   


 


Hgb   


 


Hct   


 


MCH   


 


RDW   


 


Lymph % (Auto)   


 


Mono % (Auto)   


 


Lymph #   


 


Mono #   


 


Seg Neutrophils %   


 


Seg Neutrophils #   


 


PT   


 


D-Dimer   


 


Heparin Anti-Xa Level   0.26 L  0.21 L


 


POC ABG pH   


 


POC ABG pCO2   


 


Sodium   


 


Carbon Dioxide   


 


BUN   


 


Glucose   


 


Calcium   


 


C-Reactive Protein  16.50 H  


 


Total Protein   


 


Albumin   


 


Ur Specific Gravity   














  02/25/19





  05:30


 


RBC 


 


Hgb  8.5 L


 


Hct  26.4 L


 


MCH 


 


RDW 


 


Lymph % (Auto) 


 


Mono % (Auto) 


 


Lymph # 


 


Mono # 


 


Seg Neutrophils % 


 


Seg Neutrophils # 


 


PT 


 


D-Dimer 


 


Heparin Anti-Xa Level 


 


POC ABG pH 


 


POC ABG pCO2 


 


Sodium 


 


Carbon Dioxide 


 


BUN 


 


Glucose 


 


Calcium 


 


C-Reactive Protein 


 


Total Protein 


 


Albumin 


 


Ur Specific Gravity 











Allied health notes reviewed: nursing

## 2019-02-25 NOTE — PROGRESS NOTE
Assessment and Plan


Pt converted to AFib with RVR this AM. IV amio resumed. Cont IV amio and 

increase lopressor to 50mg BID. 


Convert heparin gtt to Eliquis 5mg BID. 





The patient has been seen in conjunction with Dr. Nava who agrees with the 

assessment and plan of care. 








- Patient Problems


(1) Atrial flutter with rapid ventricular response


Current Visit: Yes   Status: Acute   





(2) Pulmonary infiltrate


Current Visit: Yes   Status: Acute   





(3) Hypotension


Current Visit: Yes   Status: Acute   





(4) CAD (coronary artery disease)


Current Visit: Yes   Status: Chronic   





(5) Stented coronary artery


Current Visit: Yes   Status: Chronic   





(6) Leg wound, left


Current Visit: Yes   Status: Acute   





(7) Morbid obesity


Current Visit: Yes   Status: Chronic   





(8) Sleep apnea


Current Visit: Yes   Status: Chronic   





(9) Anemia


Current Visit: Yes   Status: Acute   





Subjective


Date of service: 02/25/19


Principal diagnosis: AFlutter


Interval history: 


pt resting in bed, developed AFib with RVR this AM, states she is not feeling 

very well. on amio gtt. 








Objective





                                Last Vital Signs











Temp  97.9 F   02/25/19 12:00


 


Pulse  117 H  02/25/19 14:01


 


Resp  22   02/25/19 14:01


 


BP  115/83   02/25/19 14:01


 


Pulse Ox  99   02/25/19 14:01




















- Physical Examination


General: No Apparent Distress


HEENT: Positive: PERRL, Normocephaly, Mucus Membranes Moist


Neck: Positive: neck supple, trachea midline


Cardiac: Positive: irregularly irregular, S1/S2, Tachycardia


Lungs: Positive: Decreased Breath Sounds


Neuro: Positive: Grossly Intact


Abdomen: Positive: Soft.  Negative: Tender


Skin: Negative: Rash, Wound


Musculoskeletal: No Pain


Extremities: Absent: edema





- Labs and Meds


                                       CBC











  02/25/19 Range/Units





  05:30 


 


Hgb  8.5 L  (10.1-14.3)  gm/dl


 


Hct  26.4 L  (30.3-42.9)  %


 


Plt Count  331  (140-440)  K/mm3














- Imaging and Cardiology


EKG: report reviewed, image reviewed


Echo: report reviewed (2/2019: EF 50-55%. 7/2018: EF 55-60%)





- Telemetry


EKG Rhythm: Atrial Fibrillation

## 2019-02-25 NOTE — PROGRESS NOTE
Assessment and Plan


Assessment and plan: 


65 YO Female with MO,Obesity Hypoventilation on CPAP,  CAD S/P Stent Placement, 

HTN presents to ED for evaluation. Pt states that she has experienced shortness 

of breath, chest palpitations, and chest discomfort over the past 2 days with 

worsening symptoms over the same time frame. Pt seen and evaluated by her PCP 

and was initially treated with oral antibiotic therapy without relief of 

symptoms that were suspected secondary to a respiratory tract infection. Pt 

subsequently presents to ED for evaluation. Pt seen and evaluated in ED and 

found to have Atrial Flutter with RVR, Acute Respiratory Failure, as well as 

Left Leg Cellulitis. Pt treated with medical cardioversion with cardizem without

improvement. Cardiology consulted in ED. Pt then placed on amidarone drip and 

heparin drip as per cardiology request. Pt declined synchronized cardioversion. 

Pt admitted to ICU. Pt denies fever, chills, NVD, Trauma, BRBPR, Hemoptysis, or 

recent ill contacts. 





* On admission the patient was placed in the intensive care unit started on 

  amiodarone drip and also pressor.


* She subsequently converted to sinus rhythm and Lopressor was initiated 

  following blood pressure improvement and pressor being discontinued


* Cardiology also converted from heparin drip to eliquis 5mg twice a day


* Imaging studies demonstrated multifocal infiltrate as noted and Patient was 

  treated in accordance





Acute Respiratory failure with multifocal pneumonia


Sepsis- POA. On review of record patient had low grade fever on admission. CXR 

also shows mutifocality of infiltrate


Atrial flutter with RVR


Shock STATE. 


Bronchiectasis


Sleep apnea


Anemia


CAD with hx of PCI


Left lower ext wound with resolving cellulites- wound culture showing MRSA


Obesity Hypoventilation Syndrome


Morbid obesity


Depression








Plan


Review of xray shows Multifocal pneumonia


Doubt septic shock. Low bp Likely from aflutter


Continue abx, Continue Vancomycin for wound culture noted to have MRSA. Patient 

intolerant to bactrim. Can be dicharged on Doxyclin and have outpatient ID eval


Surgical input noted


Continue amiodarone drip and adjust with inclusion on Toprol xl when off 

pressors.


DVT/GI prophy


Plan discussed with patient and spouse. 








The high probability of a clinically significant, sudden or life threatening 

deterioration of the [cardiac,renal, respiratory] system(s) required my full and

direct attention, intervention and personal management. The aggregate critical 

care time was [35] minutes. This time is in addition to time spent performing 

reported procedures but includes the following: 





[x] Data Review and interpretation 





[x] Patient assessment and monitoring of vital signs 





[x] Documentation 





[x] Medication orders and management











History


Interval history: 


Patient seen and examined, reports improvement With noted shortness but 

improving. No chest pain at this time and no dizziness.  No other other adverse 

event reported by nursing staff.

















Hospitalist Physical





- Physical exam


Narrative exam: 


 VITAL SIGNS:  Reviewed.    


GENERAL:  The patient appeared well nourished and normally developed. OBESE. 

Vital signs as documented.


HEAD:  No signs of head trauma.


EYES:  Pupils are equal.  Extraocular motions intact.  


EARS:  Hearing grossly intact.


MOUTH:  Oropharynx is normal. 


NECK:  No adenopathy, no JVD.   


CHEST:  Chest with clear breath sounds bilaterally.  No wheezes, rales, or 

rhonchi.  


CARDIAC:  Regular rate and rhythm.  S1 and S2, without murmurs, gallops, or 

rubs.


VASCULAR:  No Edema.  Peripheral pulses normal and equal in all extremities.


ABDOMEN:  Soft, without detectable tenderness.  No sign of distention.  No   

rebound or guarding, and no masses palpated.   Bowel Sounds normal.


MUSCULOSKELETAL:  Good range of motion of all major joints. Extremities without 

clubbing, cyanosis or edema.  


NEUROLOGIC EXAM:  Alert and oriented x 3.  No focal sensory or strength 

deficits.   Speech normal.  Follows commands.


PSYCHIATRIC:  Mood normal.


SKIN:  Right neck line. Left lower ext wound-see wound care documentation. LLE 

wound healing well. L lateral calf wound appears healed. L medial calf wound w

ith pink base. Periwound tissue unremarkable. No drainage. No odor.  Some dry 

dressing in place














- Constitutional


Vitals: 


                                        











Temp Pulse Resp BP Pulse Ox


 


 97.9 F   117 H  22   115/83   99 


 


 02/25/19 12:00  02/25/19 14:01  02/25/19 14:01  02/25/19 14:01  02/25/19 14:01











General appearance: Present: mild distress, obese





Results





- Labs


CBC & Chem 7: 


                                 02/25/19 05:30





                                 02/23/19 05:08


Labs: 


                             Laboratory Last Values











WBC  6.7 K/mm3 (4.5-11.0)   02/23/19  05:08    


 


RBC  3.36 M/mm3 (3.65-5.03)  L  02/23/19  05:08    


 


Hgb  8.5 gm/dl (10.1-14.3)  L  02/25/19  05:30    


 


Hct  26.4 % (30.3-42.9)  L  02/25/19  05:30    


 


MCV  79 fl (79-97)   02/23/19  05:08    


 


MCH  26 pg (28-32)  L  02/23/19  05:08    


 


MCHC  33 % (30-34)   02/23/19  05:08    


 


RDW  17.8 % (13.2-15.2)  H  02/23/19  05:08    


 


Plt Count  331 K/mm3 (140-440)   02/25/19  05:30    


 


Lymph % (Auto)  12.1 % (13.4-35.0)  L  02/22/19  03:53    


 


Mono % (Auto)  12.7 % (0.0-7.3)  H  02/22/19  03:53    


 


Eos % (Auto)  0.6 % (0.0-4.3)   02/22/19  03:53    


 


Baso % (Auto)  0.3 % (0.0-1.8)   02/22/19  03:53    


 


Lymph #  1.3 K/mm3 (1.2-5.4)   02/22/19  03:53    


 


Mono #  1.4 K/mm3 (0.0-0.8)  H  02/22/19  03:53    


 


Eos #  0.1 K/mm3 (0.0-0.4)   02/22/19  03:53    


 


Baso #  0.0 K/mm3 (0.0-0.1)   02/22/19  03:53    


 


Seg Neutrophils %  74.3 % (40.0-70.0)  H  02/22/19  03:53    


 


Seg Neutrophils #  8.1 K/mm3 (1.8-7.7)  H  02/22/19  03:53    


 


PT  15.2 Sec. (12.2-14.9)  H  02/21/19  21:02    


 


INR  1.13  (0.87-1.13)   02/21/19  21:02    


 


APTT  29.2 Sec. (24.2-36.6)   02/21/19  21:02    


 


D-Dimer  519.87 ng/mlDDU (0-234)  H  02/21/19  16:57    


 


Heparin Anti-Xa Level  0.56 U.I./ml (0.3-0.7)   02/25/19  01:15    


 


POC ABG pH  7.322  (7.35-7.45)  L  02/23/19  17:27    


 


POC ABG pCO2  33.3  (35-45)  L  02/23/19  17:27    


 


POC ABG pO2  104  ()   02/23/19  17:27    


 


POC ABG HCO3  17.2   02/23/19  17:27    


 


POC ABG Total CO2  18   02/23/19  17:27    


 


POC ABG O2 Sat  98   02/23/19  17:27    


 


POC ABG Base Excess  -9   02/23/19  17:27    


 


VBG pH  7.359  (7.320-7.420)   02/21/19  17:35    


 


FiO2  28 %  02/23/19  17:27    


 


Sodium  134 mmol/L (137-145)  L  02/23/19  05:08    


 


Potassium  4.1 mmol/L (3.6-5.0)   02/23/19  05:08    


 


Chloride  104.3 mmol/L ()   02/23/19  05:08    


 


Carbon Dioxide  20 mmol/L (22-30)  L  02/23/19  05:08    


 


Anion Gap  14 mmol/L  02/23/19  05:08    


 


BUN  13 mg/dL (7-17)   02/23/19  05:08    


 


Creatinine  0.8 mg/dL (0.7-1.2)   02/23/19  05:08    


 


Estimated GFR  > 60 ml/min  02/23/19  05:08    


 


BUN/Creatinine Ratio  16 %  02/23/19  05:08    


 


Glucose  114 mg/dL ()  H  02/23/19  05:08    


 


Lactic Acid  1.10 mmol/L (0.7-2.0)   02/21/19  17:35    


 


Calcium  7.6 mg/dL (8.4-10.2)  L  02/23/19  05:08    


 


Magnesium  1.90 mg/dL (1.7-2.3)   02/21/19  17:20    


 


Total Bilirubin  0.30 mg/dL (0.1-1.2)   02/23/19  05:08    


 


AST  20 units/L (5-40)   02/23/19  05:08    


 


ALT  18 units/L (7-56)   02/23/19  05:08    


 


Alkaline Phosphatase  77 units/L ()   02/23/19  05:08    


 


Troponin T  < 0.010 ng/mL (0.00-0.029)   02/21/19  15:52    


 


C-Reactive Protein  16.50 mg/dL (0.00-1.30)  H  02/23/19  18:45    


 


Total Protein  5.7 g/dL (6.3-8.2)  L  02/23/19  05:08    


 


Albumin  3.2 g/dL (3.9-5)  L  02/23/19  05:08    


 


Albumin/Globulin Ratio  1.3 %  02/23/19  05:08    


 


TSH  0.892 mlU/mL (0.270-4.200)   02/21/19  16:57    


 


Free T4  0.99 ng/dL (0.76-1.46)   02/21/19  16:57    


 


Urine Color  Yellow  (Yellow)   02/21/19  22:47    


 


Urine Turbidity  Clear  (Clear)   02/21/19  22:47    


 


Urine pH  5.0  (5.0-7.0)   02/21/19  22:47    


 


Ur Specific Gravity  1.054  (1.003-1.030)  H  02/21/19  22:47    


 


Urine Protein  <15 mg/dl mg/dL (Negative)   02/21/19  22:47    


 


Urine Glucose (UA)  Neg mg/dL (Negative)   02/21/19  22:47    


 


Urine Ketones  Neg mg/dL (Negative)   02/21/19  22:47    


 


Urine Blood  Neg  (Negative)   02/21/19  22:47    


 


Urine Nitrite  Neg  (Negative)   02/21/19  22:47    


 


Urine Bilirubin  Neg  (Negative)   02/21/19  22:47    


 


Urine Urobilinogen  < 2.0 mg/dL (<2.0)   02/21/19  22:47    


 


Ur Leukocyte Esterase  Neg  (Negative)   02/21/19  22:47    


 


Urine WBC (Auto)  < 1.0 /HPF (0.0-6.0)   02/21/19  22:47    


 


Urine RBC (Auto)  6.0 /HPF (0.0-6.0)   02/21/19  22:47    


 


U Epithel Cells (Auto)  < 1.0 /HPF (0-13.0)   02/21/19  22:47    


 


Urine Mucus  Few /HPF  02/21/19  22:47

## 2019-02-26 PROCEDURE — 5A09457 ASSISTANCE WITH RESPIRATORY VENTILATION, 24-96 CONSECUTIVE HOURS, CONTINUOUS POSITIVE AIRWAY PRESSURE: ICD-10-PCS | Performed by: INTERNAL MEDICINE

## 2019-02-26 RX ADMIN — LORAZEPAM PRN MG: 2 INJECTION INTRAMUSCULAR; INTRAVENOUS at 21:56

## 2019-02-26 RX ADMIN — FAMOTIDINE SCH MG: 20 TABLET ORAL at 11:36

## 2019-02-26 RX ADMIN — APIXABAN SCH MG: 5 TABLET, FILM COATED ORAL at 11:36

## 2019-02-26 RX ADMIN — Medication SCH ML: at 21:35

## 2019-02-26 RX ADMIN — FAMOTIDINE SCH MG: 20 TABLET ORAL at 21:35

## 2019-02-26 RX ADMIN — CEFTRIAXONE SODIUM SCH MLS/HR: 2 INJECTION, POWDER, FOR SOLUTION INTRAMUSCULAR; INTRAVENOUS at 12:00

## 2019-02-26 RX ADMIN — ALBUTEROL SULFATE PRN MG: 2.5 SOLUTION RESPIRATORY (INHALATION) at 07:22

## 2019-02-26 RX ADMIN — BUPROPION HYDROCHLORIDE SCH MG: 100 TABLET, FILM COATED, EXTENDED RELEASE ORAL at 21:36

## 2019-02-26 RX ADMIN — APIXABAN SCH MG: 5 TABLET, FILM COATED ORAL at 21:35

## 2019-02-26 RX ADMIN — AZITHROMYCIN SCH MLS/HR: 500 INJECTION, POWDER, LYOPHILIZED, FOR SOLUTION INTRAVENOUS at 12:56

## 2019-02-26 RX ADMIN — OXYCODONE AND ACETAMINOPHEN PRN TAB: 5; 325 TABLET ORAL at 11:37

## 2019-02-26 RX ADMIN — ALBUTEROL SULFATE PRN MG: 2.5 SOLUTION RESPIRATORY (INHALATION) at 13:41

## 2019-02-26 RX ADMIN — METOPROLOL TARTRATE SCH MG: 50 TABLET, FILM COATED ORAL at 11:36

## 2019-02-26 RX ADMIN — BUPROPION HYDROCHLORIDE SCH MG: 100 TABLET, FILM COATED, EXTENDED RELEASE ORAL at 12:57

## 2019-02-26 RX ADMIN — METOPROLOL TARTRATE SCH MG: 50 TABLET, FILM COATED ORAL at 21:35

## 2019-02-26 NOTE — PROGRESS NOTE
Assessment and Plan


Assessment and plan: 


Patient is 65 YO Female with MO,Obesity Hypoventilation on CPAP,  CAD S/P Stent 

Placement, HTN presents to ED for evaluation. Pt states that she has experienced

shortness of breath, chest palpitations, and chest discomfort over the past 2 

days with worsening symptoms over the same time frame. Pt seen and evaluated by 

her PCP and was initially treated with oral antibiotic therapy without relief of

symptoms that were suspected secondary to a respiratory tract infection. Pt 

subsequently presents to ED for evaluation. Pt seen and evaluated in ED and 

found to have Atrial Flutter with RVR, Acute Respiratory Failure, as well as 

Left Leg Cellulitis. Pt treated with medical cardioversion with cardizem without

improvement. Pt then placed on amidarone drip and heparin drip as per cardiology

request. Pt declined synchronized cardioversion. 





* On admission the patient was placed in the intensive care unit started on 

  amiodarone drip and also pressor.


* She subsequently converted to sinus rhythm and Lopressor was initiated 

  following blood pressure improvement and pressor being discontinued


* Cardiology also converted from heparin drip to eliquis 5mg twice a day


* Imaging studies demonstrated multifocal infiltrate as noted and Patient was 

  treated in accordance





Acute Respiratory failure with multifocal pneumonia


Sepsis- POA. On review of record patient had low grade fever on admission. CXR 

also shows mutifocality of infiltrate


Atrial flutter with RVR


Shock STATE. 


Bronchiectasis


Sleep apnea


Anemia


CAD with hx of PCI


Left lower ext wound with resolving cellulites- wound culture showing MRSA


Obesity Hypoventilation Syndrome


Morbid obesity


Depression








Plan


Review of xray shows Multifocal pneumonia


Doubt septic shock. Low bp Likely from aflutter


Continue abx, Continue Vancomycin for wound culture noted to have MRSA. Patient 

intolerant to bactrim. Can be discharged on Doxyclin 


Surgical input noted


Continue amiodarone drip and adjust with inclusion on Toprol xl when off 

pressors.


DVT/GI prophy


Plan discussed with patient and family at bedside

















History


Interval history: 


Shortness of breath,


No more fever








Hospitalist Physical





- Physical exam


Narrative exam: 








GEN: Not in acute distress, lying in bed, morbidly obese


HEENT: Normocephalic, atraumatic, 


Neck: supple, No JVD


Lungs: Decreased breath sounds bilaterally, crackles left base, no wheeze


Heart:S1 and S2 regular, no murmurs, rubs or gallop,  


Abd:soft, non tender, non distended, normal bowel sounds


Ext: No edema, no clubbing or cyanosis


Neuro:Awake,alert,oriented x 3, moves all ext, no focal neurological signs





- Constitutional


Vitals: 


                                        











Temp Pulse Resp BP Pulse Ox


 


 98.4 F   67   20   125/71   98 


 


 02/26/19 12:34  02/26/19 12:34  02/26/19 12:34  02/26/19 12:34  02/26/19 12:34











General appearance: Present: obese





Results





- Labs


CBC & Chem 7: 


                                 02/27/19 04:55





                                 02/27/19 04:55


Labs: 


                             Laboratory Last Values











WBC  6.7 K/mm3 (4.5-11.0)   02/23/19  05:08    


 


RBC  3.36 M/mm3 (3.65-5.03)  L  02/23/19  05:08    


 


Hgb  8.5 gm/dl (10.1-14.3)  L  02/25/19  05:30    


 


Hct  26.4 % (30.3-42.9)  L  02/25/19  05:30    


 


MCV  79 fl (79-97)   02/23/19  05:08    


 


MCH  26 pg (28-32)  L  02/23/19  05:08    


 


MCHC  33 % (30-34)   02/23/19  05:08    


 


RDW  17.8 % (13.2-15.2)  H  02/23/19  05:08    


 


Plt Count  331 K/mm3 (140-440)   02/25/19  05:30    


 


Lymph % (Auto)  12.1 % (13.4-35.0)  L  02/22/19  03:53    


 


Mono % (Auto)  12.7 % (0.0-7.3)  H  02/22/19  03:53    


 


Eos % (Auto)  0.6 % (0.0-4.3)   02/22/19  03:53    


 


Baso % (Auto)  0.3 % (0.0-1.8)   02/22/19  03:53    


 


Lymph #  1.3 K/mm3 (1.2-5.4)   02/22/19  03:53    


 


Mono #  1.4 K/mm3 (0.0-0.8)  H  02/22/19  03:53    


 


Eos #  0.1 K/mm3 (0.0-0.4)   02/22/19  03:53    


 


Baso #  0.0 K/mm3 (0.0-0.1)   02/22/19  03:53    


 


Seg Neutrophils %  74.3 % (40.0-70.0)  H  02/22/19  03:53    


 


Seg Neutrophils #  8.1 K/mm3 (1.8-7.7)  H  02/22/19  03:53    


 


PT  15.2 Sec. (12.2-14.9)  H  02/21/19  21:02    


 


INR  1.13  (0.87-1.13)   02/21/19  21:02    


 


APTT  29.2 Sec. (24.2-36.6)   02/21/19  21:02    


 


D-Dimer  519.87 ng/mlDDU (0-234)  H  02/21/19  16:57    


 


Heparin Anti-Xa Level  0.56 U.I./ml (0.3-0.7)   02/25/19  01:15    


 


POC ABG pH  7.322  (7.35-7.45)  L  02/23/19  17:27    


 


POC ABG pCO2  33.3  (35-45)  L  02/23/19  17:27    


 


POC ABG pO2  104  ()   02/23/19  17:27    


 


POC ABG HCO3  17.2   02/23/19  17:27    


 


POC ABG Total CO2  18   02/23/19  17:27    


 


POC ABG O2 Sat  98   02/23/19  17:27    


 


POC ABG Base Excess  -9   02/23/19  17:27    


 


VBG pH  7.359  (7.320-7.420)   02/21/19  17:35    


 


FiO2  28 %  02/23/19  17:27    


 


Sodium  134 mmol/L (137-145)  L  02/23/19  05:08    


 


Potassium  4.1 mmol/L (3.6-5.0)   02/23/19  05:08    


 


Chloride  104.3 mmol/L ()   02/23/19  05:08    


 


Carbon Dioxide  20 mmol/L (22-30)  L  02/23/19  05:08    


 


Anion Gap  14 mmol/L  02/23/19  05:08    


 


BUN  13 mg/dL (7-17)   02/23/19  05:08    


 


Creatinine  0.8 mg/dL (0.7-1.2)   02/23/19  05:08    


 


Estimated GFR  > 60 ml/min  02/23/19  05:08    


 


BUN/Creatinine Ratio  16 %  02/23/19  05:08    


 


Glucose  114 mg/dL ()  H  02/23/19  05:08    


 


Lactic Acid  1.10 mmol/L (0.7-2.0)   02/21/19  17:35    


 


Calcium  7.6 mg/dL (8.4-10.2)  L  02/23/19  05:08    


 


Magnesium  1.90 mg/dL (1.7-2.3)   02/21/19  17:20    


 


Total Bilirubin  0.30 mg/dL (0.1-1.2)   02/23/19  05:08    


 


AST  20 units/L (5-40)   02/23/19  05:08    


 


ALT  18 units/L (7-56)   02/23/19  05:08    


 


Alkaline Phosphatase  77 units/L ()   02/23/19  05:08    


 


Troponin T  < 0.010 ng/mL (0.00-0.029)   02/21/19  15:52    


 


C-Reactive Protein  16.50 mg/dL (0.00-1.30)  H  02/23/19  18:45    


 


Total Protein  5.7 g/dL (6.3-8.2)  L  02/23/19  05:08    


 


Albumin  3.2 g/dL (3.9-5)  L  02/23/19  05:08    


 


Albumin/Globulin Ratio  1.3 %  02/23/19  05:08    


 


TSH  0.892 mlU/mL (0.270-4.200)   02/21/19  16:57    


 


Free T4  0.99 ng/dL (0.76-1.46)   02/21/19  16:57    


 


Urine Color  Yellow  (Yellow)   02/21/19  22:47    


 


Urine Turbidity  Clear  (Clear)   02/21/19  22:47    


 


Urine pH  5.0  (5.0-7.0)   02/21/19  22:47    


 


Ur Specific Gravity  1.054  (1.003-1.030)  H  02/21/19  22:47    


 


Urine Protein  <15 mg/dl mg/dL (Negative)   02/21/19  22:47    


 


Urine Glucose (UA)  Neg mg/dL (Negative)   02/21/19  22:47    


 


Urine Ketones  Neg mg/dL (Negative)   02/21/19  22:47    


 


Urine Blood  Neg  (Negative)   02/21/19  22:47    


 


Urine Nitrite  Neg  (Negative)   02/21/19  22:47    


 


Urine Bilirubin  Neg  (Negative)   02/21/19  22:47    


 


Urine Urobilinogen  < 2.0 mg/dL (<2.0)   02/21/19  22:47    


 


Ur Leukocyte Esterase  Neg  (Negative)   02/21/19  22:47    


 


Urine WBC (Auto)  < 1.0 /HPF (0.0-6.0)   02/21/19  22:47    


 


Urine RBC (Auto)  6.0 /HPF (0.0-6.0)   02/21/19  22:47    


 


U Epithel Cells (Auto)  < 1.0 /HPF (0-13.0)   02/21/19  22:47    


 


Urine Mucus  Few /HPF  02/21/19  22:47

## 2019-02-26 NOTE — PROGRESS NOTE
Assessment and Plan


Pt converted to SR. IV amio resumed. D/c amio gtt and cont PO lopressor and 

Eliquis.





The patient has been seen in conjunction with Dr. Nava who agrees with the 

assessment and plan of care. 








- Patient Problems


(1) Atrial flutter with rapid ventricular response


Current Visit: Yes   Status: Acute   





(2) Pulmonary infiltrate


Current Visit: Yes   Status: Acute   





(3) Hypotension


Current Visit: Yes   Status: Acute   





(4) CAD (coronary artery disease)


Current Visit: Yes   Status: Chronic   





(5) Stented coronary artery


Current Visit: Yes   Status: Chronic   





(6) Leg wound, left


Current Visit: Yes   Status: Acute   





(7) Morbid obesity


Current Visit: Yes   Status: Chronic   





(8) Sleep apnea


Current Visit: Yes   Status: Chronic   





(9) Anemia


Current Visit: Yes   Status: Acute   





Subjective


Date of service: 02/26/19


Principal diagnosis: Acute hypoxemic resp failure; AFib with RVR; Obesity; 

SILVANA/OHS; CAD.


Interval history: 


pt resting in bed, converted to SR overnight. amio gtt infusing. 





Objective





                                Last Vital Signs











Temp  98.3 F   02/26/19 07:20


 


Pulse  64   02/26/19 07:27


 


Resp  18   02/26/19 07:27


 


BP  136/66   02/26/19 07:20


 


Pulse Ox  98   02/26/19 07:26

















- Physical Examination


General: No Apparent Distress


HEENT: Positive: PERRL, Normocephaly, Mucus Membranes Moist


Neck: Positive: neck supple, trachea midline


Cardiac: Positive: Reg Rate and Rhythm, S1/S2


Lungs: Positive: Decreased Breath Sounds


Neuro: Positive: Grossly Intact


Abdomen: Positive: Soft.  Negative: Tender


Skin: Negative: Rash, Wound


Musculoskeletal: No Pain


Extremities: Absent: edema





- Imaging and Cardiology


EKG: report reviewed, image reviewed


Echo: report reviewed (2/2019: EF 50-55%. 7/2018: EF 55-60%)





- Allied health notes


Allied health notes reviewed: nursing

## 2019-02-26 NOTE — PROGRESS NOTE
Assessment and Plan


Acute hypoxemic respiratory failure.


Sepsis


Atrial fibrillation with rapid ventricular response, new onset.


Obesity.


Obesity hypoventilation syndrome, plus obstructive sleep apnea.


History of coronary artery disease.


Anemia that is normocytic.


Elevated D-dimer.


Mild hyponatremia.


Mild metabolic acidosis.


Left lower extremity wound.








-Continue supplemental oxygen to keep O2 sats 88-90%


-Nocturnal BIPAP and prn during the day when asleep


- prn ABG and  CXR


- continue bronchodilators with pulmonary hygiene per RT


- continue empiric CAP antibiotics to complete course


- continue amiodarone for rate control


- continue glycemic control with SSI for target -180 mg/dl 


- weight loss counseled


- continue full anticoagulation for A-fib


- negative VTE w/up


- aspiration precautions


- PT/OT, increase activity


- wound care per WCT for left lower extremity wound


- continue other care per attending /other consultants


-discharge planning








Subjective


Date of service: 02/26/19


Principal diagnosis: Acute hypoxemic resp failure; AFib with RVR; Obesity; 

SILVANA/OHS; CAD.


Interval history: 








Patient is seen today for: Acute hypoxemic respiratory failure; Atrial 

fibrillation with rapid ventricular response, new onset; Obesity; Obesity 

hypoventilation syndrome, plus or minus obstructive sleep apnea; History of 

coronary artery disease.





Seen and examined at bedside; 24hour events reviewed; nursing and respiratory 

care staff consulted; no adverse overnight events reported to me; resting 

peacefully in bed; no cough, no chest pain, no fevers, or chills. No nausea or 

vomiting.


States she has a diagnosis of sleep apnea and uses a CPAP machine at home








Objective


                               Vital Signs - 12hr











  02/25/19 02/25/19 02/25/19





  22:00 22:01 22:41


 


Temperature   


 


Pulse Rate 66 67 69


 


Pulse Rate [   





Anterior   





Bilateral   





Throughout]   


 


Pulse Rate [   





Bilateral   





Throughout]   


 


Pulse Rate [   





From Monitor]   


 


Respiratory  22 





Rate   


 


Respiratory   





Rate [Anterior   





Bilateral   





Throughout]   


 


Respiratory   





Rate [Bilateral   





Throughout]   


 


Blood Pressure  134/73 139/76


 


O2 Sat by Pulse  98 





Oximetry   














  02/25/19 02/25/19 02/25/19





  23:00 23:11 23:40


 


Temperature   99 F


 


Pulse Rate 66 66 


 


Pulse Rate [   





Anterior   





Bilateral   





Throughout]   


 


Pulse Rate [   





Bilateral   





Throughout]   


 


Pulse Rate [   





From Monitor]   


 


Respiratory 20 21 





Rate   


 


Respiratory   





Rate [Anterior   





Bilateral   





Throughout]   


 


Respiratory   





Rate [Bilateral   





Throughout]   


 


Blood Pressure 125/73 125/73 


 


O2 Sat by Pulse 99 99 





Oximetry   














  02/26/19 02/26/19 02/26/19





  00:00 00:01 00:30


 


Temperature   


 


Pulse Rate  61 64


 


Pulse Rate [   





Anterior   





Bilateral   





Throughout]   


 


Pulse Rate [   





Bilateral   





Throughout]   


 


Pulse Rate [ 68  





From Monitor]   


 


Respiratory 18 10 L 19





Rate   


 


Respiratory   





Rate [Anterior   





Bilateral   





Throughout]   


 


Respiratory   





Rate [Bilateral   





Throughout]   


 


Blood Pressure  125/65 


 


O2 Sat by Pulse  98 97





Oximetry   














  02/26/19 02/26/19 02/26/19





  00:39 00:40 03:15


 


Temperature  98.2 F 


 


Pulse Rate 64 63 85


 


Pulse Rate [   





Anterior   





Bilateral   





Throughout]   


 


Pulse Rate [   





Bilateral   





Throughout]   


 


Pulse Rate [   





From Monitor]   


 


Respiratory  22 





Rate   


 


Respiratory   





Rate [Anterior   





Bilateral   





Throughout]   


 


Respiratory   





Rate [Bilateral   





Throughout]   


 


Blood Pressure   


 


O2 Sat by Pulse 98 94 





Oximetry   














  02/26/19 02/26/19 02/26/19





  03:19 07:20 07:26


 


Temperature 97.7 F 98.3 F 


 


Pulse Rate 58 L 64 


 


Pulse Rate [   





Anterior   





Bilateral   





Throughout]   


 


Pulse Rate [   





Bilateral   





Throughout]   


 


Pulse Rate [   





From Monitor]   


 


Respiratory 17 22 





Rate   


 


Respiratory   





Rate [Anterior   





Bilateral   





Throughout]   


 


Respiratory   





Rate [Bilateral   





Throughout]   


 


Blood Pressure 116/70 136/66 


 


O2 Sat by Pulse 96 98 98





Oximetry   














  02/26/19





  07:27


 


Temperature 


 


Pulse Rate 


 


Pulse Rate [ 64





Anterior 





Bilateral 





Throughout] 


 


Pulse Rate [ 64





Bilateral 





Throughout] 


 


Pulse Rate [ 





From Monitor] 


 


Respiratory 





Rate 


 


Respiratory 18





Rate [Anterior 





Bilateral 





Throughout] 


 


Respiratory 18





Rate [Bilateral 





Throughout] 


 


Blood Pressure 


 


O2 Sat by Pulse 





Oximetry 











Constitutional: appears uncomfortable, other (elderly looking morbidly obese CF,

normocephalic and atraumatic with mildly increased resp effort at rest)


Eyes: non-icteric


ENT: oropharynx moist, other (Mallampati 4)


Neck: supple, no lymphadenopathy, no JVD, other (no thyromegaly)


Effort: mildly labored


Ascultation: Bilateral: diminished breath sounds, rhonchi


Percussion: Bilateral: not dull


Cardiovascular: irregular rhythm


Gastrointestinal: normoactive bowel sounds, soft, non-tender, non-distended, 

other (No HSM)


Integumentary: rash


Extremities: no cyanosis, pink and warm, pulses normal, no ischemia or 

petechiae, edema (1+)


Neurologic: normal mental status, non-focal exam (grossly), pupils equal and 

round, CN II-XII normal, motor strength normal and


Psychiatric: mood appropriate, affect normal


CBC and BMP: 


                                 02/27/19 04:55





                                 02/27/19 04:55


ABG, PT/INR, D-dimer: 


ABG











POC ABG pH  7.322  (7.35-7.45)  L  02/23/19  17:27    


 


POC ABG pCO2  33.3  (35-45)  L  02/23/19  17:27    


 


POC ABG pO2  104  ()   02/23/19  17:27    


 


POC ABG HCO3  17.2   02/23/19  17:27    


 


POC ABG Total CO2  18   02/23/19  17:27    


 


POC ABG O2 Sat  98   02/23/19  17:27    





PT/INR, D-dimer











PT  15.2 Sec. (12.2-14.9)  H  02/21/19  21:02    


 


INR  1.13  (0.87-1.13)   02/21/19  21:02    


 


D-Dimer  519.87 ng/mlDDU (0-234)  H  02/21/19  16:57    








Abnormal lab findings: 


                                  Abnormal Labs











  02/21/19 02/21/19 02/21/19





  10:08 10:08 16:57


 


RBC   


 


Hgb   


 


Hct   


 


MCH  26 L  


 


RDW  17.6 H  


 


Lymph % (Auto)  4.8 L  


 


Mono % (Auto)  8.5 H  


 


Lymph #  0.5 L  


 


Mono #   


 


Seg Neutrophils %  84.9 H  


 


Seg Neutrophils #  8.4 H  


 


PT   


 


D-Dimer    519.87 H


 


Heparin Anti-Xa Level   


 


POC ABG pH   


 


POC ABG pCO2   


 


Sodium   134 L 


 


Carbon Dioxide   20 L 


 


BUN   21 H 


 


Glucose   138 H 


 


Calcium   


 


C-Reactive Protein   


 


Total Protein   


 


Albumin   3.8 L 


 


Ur Specific Gravity   














  02/21/19 02/21/19 02/21/19





  21:02 22:47 23:12


 


RBC   


 


Hgb    9.7 L


 


Hct   


 


MCH   


 


RDW   


 


Lymph % (Auto)   


 


Mono % (Auto)   


 


Lymph #   


 


Mono #   


 


Seg Neutrophils %   


 


Seg Neutrophils #   


 


PT  15.2 H  


 


D-Dimer   


 


Heparin Anti-Xa Level   


 


POC ABG pH   


 


POC ABG pCO2   


 


Sodium   


 


Carbon Dioxide   


 


BUN   


 


Glucose   


 


Calcium   


 


C-Reactive Protein   


 


Total Protein   


 


Albumin   


 


Ur Specific Gravity   1.054 H 














  02/22/19 02/22/19 02/22/19





  03:53 03:53 03:53


 


RBC   


 


Hgb   


 


Hct   


 


MCH  26 L  


 


RDW  18.1 H  


 


Lymph % (Auto)  12.1 L  


 


Mono % (Auto)  12.7 H  


 


Lymph #   


 


[place]wey #  1.4 H  


 


Seg Neutrophils %  74.3 H  


 


Seg Neutrophils #  8.1 H  


 


PT   


 


D-Dimer   


 


Heparin Anti-Xa Level    0.74 H


 


POC ABG pH   


 


POC ABG pCO2   


 


Sodium   134 L 


 


Carbon Dioxide   17 L 


 


BUN   


 


Glucose   162 H 


 


Calcium   7.3 L D 


 


C-Reactive Protein   


 


Total Protein   


 


Albumin   


 


Ur Specific Gravity   














  02/23/19 02/23/19 02/23/19





  05:08 05:08 17:27


 


RBC  3.36 L  


 


Hgb  8.6 L  


 


Hct  26.5 L  


 


MCH  26 L  


 


RDW  17.8 H  


 


Lymph % (Auto)   


 


Mono % (Auto)   


 


Lymph #   


 


Mono #   


 


Seg Neutrophils %   


 


Seg Neutrophils #   


 


PT   


 


D-Dimer   


 


Heparin Anti-Xa Level   


 


POC ABG pH    7.322 L


 


POC ABG pCO2    33.3 L


 


Sodium   134 L 


 


Carbon Dioxide   20 L 


 


BUN   


 


Glucose   114 H 


 


Calcium   7.6 L 


 


C-Reactive Protein   


 


Total Protein   5.7 L 


 


Albumin   3.2 L 


 


Ur Specific Gravity   














  02/23/19 02/24/19 02/24/19





  18:45 08:15 17:25


 


RBC   


 


Hgb   


 


Hct   


 


MCH   


 


RDW   


 


Lymph % (Auto)   


 


Mono % (Auto)   


 


Lymph #   


 


Mono #   


 


Seg Neutrophils %   


 


Seg Neutrophils #   


 


PT   


 


D-Dimer   


 


Heparin Anti-Xa Level   0.26 L  0.21 L


 


POC ABG pH   


 


POC ABG pCO2   


 


Sodium   


 


Carbon Dioxide   


 


BUN   


 


Glucose   


 


Calcium   


 


C-Reactive Protein  16.50 H  


 


Total Protein   


 


Albumin   


 


Ur Specific Gravity   














  02/25/19





  05:30


 


RBC 


 


Hgb  8.5 L


 


Hct  26.4 L


 


MCH 


 


RDW 


 


Lymph % (Auto) 


 


Mono % (Auto) 


 


Lymph # 


 


Mono # 


 


Seg Neutrophils % 


 


Seg Neutrophils # 


 


PT 


 


D-Dimer 


 


Heparin Anti-Xa Level 


 


POC ABG pH 


 


POC ABG pCO2 


 


Sodium 


 


Carbon Dioxide 


 


BUN 


 


Glucose 


 


Calcium 


 


C-Reactive Protein 


 


Total Protein 


 


Albumin 


 


Ur Specific Gravity 











Allied health notes reviewed: nursing

## 2019-02-27 LAB
BUN SERPL-MCNC: 9 MG/DL (ref 7–17)
BUN/CREAT SERPL: 15 %
CALCIUM SERPL-MCNC: 8.7 MG/DL (ref 8.4–10.2)
HCT VFR BLD CALC: 26.5 % (ref 30.3–42.9)
HEMOLYSIS INDEX: 4
HGB BLD-MCNC: 8.5 GM/DL (ref 10.1–14.3)
MCHC RBC AUTO-ENTMCNC: 32 % (ref 30–34)
MCV RBC AUTO: 79 FL (ref 79–97)
PLATELET # BLD: 383 K/MM3 (ref 140–440)
RBC # BLD AUTO: 3.35 M/MM3 (ref 3.65–5.03)

## 2019-02-27 RX ADMIN — AZITHROMYCIN SCH MLS/HR: 500 INJECTION, POWDER, LYOPHILIZED, FOR SOLUTION INTRAVENOUS at 11:27

## 2019-02-27 RX ADMIN — METOPROLOL TARTRATE SCH MG: 50 TABLET, FILM COATED ORAL at 21:53

## 2019-02-27 RX ADMIN — FAMOTIDINE SCH MG: 20 TABLET ORAL at 10:17

## 2019-02-27 RX ADMIN — ALBUTEROL SULFATE PRN MG: 2.5 SOLUTION RESPIRATORY (INHALATION) at 08:08

## 2019-02-27 RX ADMIN — Medication SCH ML: at 10:23

## 2019-02-27 RX ADMIN — OXYCODONE AND ACETAMINOPHEN PRN TAB: 5; 325 TABLET ORAL at 12:57

## 2019-02-27 RX ADMIN — APIXABAN SCH MG: 5 TABLET, FILM COATED ORAL at 21:53

## 2019-02-27 RX ADMIN — CEFTRIAXONE SODIUM SCH MLS/HR: 2 INJECTION, POWDER, FOR SOLUTION INTRAMUSCULAR; INTRAVENOUS at 10:19

## 2019-02-27 RX ADMIN — Medication SCH ML: at 10:22

## 2019-02-27 RX ADMIN — METOPROLOL TARTRATE SCH MG: 50 TABLET, FILM COATED ORAL at 10:11

## 2019-02-27 RX ADMIN — BUPROPION HYDROCHLORIDE SCH MG: 100 TABLET, FILM COATED, EXTENDED RELEASE ORAL at 10:20

## 2019-02-27 RX ADMIN — FAMOTIDINE SCH MG: 20 TABLET ORAL at 21:53

## 2019-02-27 RX ADMIN — OXYCODONE AND ACETAMINOPHEN PRN TAB: 5; 325 TABLET ORAL at 20:41

## 2019-02-27 RX ADMIN — Medication SCH ML: at 21:57

## 2019-02-27 RX ADMIN — BUPROPION HYDROCHLORIDE SCH MG: 100 TABLET, FILM COATED, EXTENDED RELEASE ORAL at 21:53

## 2019-02-27 RX ADMIN — APIXABAN SCH MG: 5 TABLET, FILM COATED ORAL at 10:10

## 2019-02-27 NOTE — PROGRESS NOTE
Assessment and Plan


Pt remains in SR. Currently stable cardiac status. Cont present cardiac 

management. 


Pt continues to have c/o SOB. Further eval/management per primary. 





The patient has been seen in conjunction with Dr. Nava who agrees with the 

assessment and plan of care. 








- Patient Problems


(1) Atrial flutter with rapid ventricular response


Current Visit: Yes   Status: Acute   





(2) Pulmonary infiltrate


Current Visit: Yes   Status: Acute   





(3) Hypotension


Current Visit: Yes   Status: Acute   





(4) CAD (coronary artery disease)


Current Visit: Yes   Status: Chronic   





(5) Stented coronary artery


Current Visit: Yes   Status: Chronic   





(6) Leg wound, left


Current Visit: Yes   Status: Acute   





(7) Morbid obesity


Current Visit: Yes   Status: Chronic   





(8) Sleep apnea


Current Visit: Yes   Status: Chronic   





(9) Anemia


Current Visit: Yes   Status: Acute   





Subjective


Date of service: 02/27/19


Principal diagnosis: Acute hypoxemic resp failure; AFib with RVR; Obesity; 

SILVANA/OHS; CAD.


Interval history: 


pt resting in bed, remains in SR. c/o SOB. 





Objective





                                Last Vital Signs











Temp  98.3 F   02/27/19 08:37


 


Pulse  131 H  02/27/19 10:11


 


Resp  20   02/27/19 08:37


 


BP  113/63   02/27/19 10:11


 


Pulse Ox  99   02/27/19 09:19

















- Physical Examination


General: No Apparent Distress


HEENT: Positive: PERRL, Normocephaly, Mucus Membranes Moist


Neck: Positive: neck supple, trachea midline


Cardiac: Positive: Reg Rate and Rhythm, S1/S2


Lungs: Positive: Decreased Breath Sounds


Neuro: Positive: Grossly Intact


Abdomen: Positive: Soft.  Negative: Tender


Skin: Negative: Rash, Wound


Musculoskeletal: No Pain


Extremities: Absent: edema





- Labs and Meds


                                       CBC











  02/27/19 Range/Units





  04:55 


 


WBC  7.5  (4.5-11.0)  K/mm3


 


RBC  3.35 L  (3.65-5.03)  M/mm3


 


Hgb  8.5 L  (10.1-14.3)  gm/dl


 


Hct  26.5 L  (30.3-42.9)  %


 


Plt Count  383  (140-440)  K/mm3








                          Comprehensive Metabolic Panel











  02/27/19 Range/Units





  04:55 


 


Sodium  138  (137-145)  mmol/L


 


Potassium  4.0  (3.6-5.0)  mmol/L


 


Chloride  102.1  ()  mmol/L


 


Carbon Dioxide  26  (22-30)  mmol/L


 


BUN  9  (7-17)  mg/dL


 


Creatinine  0.6 L  (0.7-1.2)  mg/dL


 


Glucose  101 H  ()  mg/dL


 


Calcium  8.7  (8.4-10.2)  mg/dL














- Imaging and Cardiology


EKG: report reviewed, image reviewed


Echo: report reviewed (2/2019: EF 50-55%. 7/2018: EF 55-60%)





- Allied health notes


Allied health notes reviewed: nursing

## 2019-02-27 NOTE — PROGRESS NOTE
Assessment and Plan


Assessment and plan: 


Patient is 65 YO Female with MO,Obesity Hypoventilation on CPAP,  CAD S/P Stent 

Placement, HTN presents to ED for evaluation. Pt states that she has experienced

shortness of breath, chest palpitations, and chest discomfort over the past 2 

days with worsening symptoms over the same time frame. Pt seen and evaluated by 

her PCP and was initially treated with oral antibiotic therapy without relief of

symptoms that were suspected secondary to a respiratory tract infection. Pt 

subsequently presents to ED for evaluation. Pt seen and evaluated in ED and 

found to have Atrial Flutter with RVR, Acute Respiratory Failure, as well as 

Left Leg Cellulitis. Pt treated with medical cardioversion with cardizem without

improvement. Pt then placed on amidarone drip and heparin drip as per cardiology

request. Pt declined synchronized cardioversion. 





* On admission the patient was placed in the intensive care unit started on 

  amiodarone drip and also pressor.


* She subsequently converted to sinus rhythm and Lopressor was initiated 

  following blood pressure improvement and pressor being discontinued


* Cardiology also converted from heparin drip to eliquis 5mg twice a day


* Imaging studies demonstrated multifocal infiltrate as noted and Patient was 

  treated in accordance





Acute Respiratory failure due to multifocal pneumonia


Sepsis- POA. On review of record patient had low grade fever on admission. CXR 

also shows mutifocal infiltrate


Atrial flutter with RVR


Shock STATE. 


Bronchiectasis


Sleep apnea


Anemia


CAD with hx of PCI


Left lower ext wound with resolving cellulites- wound culture showing MRSA


Obesity Hypoventilation Syndrome


Morbid obesity


Depression








Plan


Review of xray shows Multifocal pneumonia


Doubt septic shock. Low BP Likely from aflutter


Continue Rocephin and Zithromax 


Surgical input noted


On Metoprolol


Off Amiodarone.


DVT/GI prophy


Plan discussed with patient

















History


Interval history: 


Shortness of breath,


No more fever








Hospitalist Physical





- Physical exam


Narrative exam: 








GEN: Not in acute distress, lying in bed, morbidly obese


HEENT: Normocephalic, atraumatic, 


Neck: supple, No JVD


Lungs: Decreased breath sounds bilaterally, crackles left base, no wheeze


Heart:S1 and S2 regular, no murmurs, rubs or gallop,  


Abd:soft, non tender, non distended, normal bowel sounds


Ext: No edema, no clubbing or cyanosis


Neuro:Awake,alert,oriented x 3, moves all ext, no focal neurological signs





- Constitutional


Vitals: 


                                        











Temp Pulse Resp BP Pulse Ox


 


 98.3 F   65   20   121/44   99 


 


 02/27/19 08:37  02/27/19 08:37  02/27/19 08:37  02/27/19 08:37  02/27/19 09:19











General appearance: Present: obese





Results





- Labs


CBC & Chem 7: 


                                 02/27/19 04:55





                                 02/27/19 04:55


Labs: 


                             Laboratory Last Values











WBC  7.5 K/mm3 (4.5-11.0)   02/27/19  04:55    


 


RBC  3.35 M/mm3 (3.65-5.03)  L  02/27/19  04:55    


 


Hgb  8.5 gm/dl (10.1-14.3)  L  02/27/19  04:55    


 


Hct  26.5 % (30.3-42.9)  L  02/27/19  04:55    


 


MCV  79 fl (79-97)   02/27/19  04:55    


 


MCH  26 pg (28-32)  L  02/27/19  04:55    


 


MCHC  32 % (30-34)   02/27/19  04:55    


 


RDW  17.3 % (13.2-15.2)  H  02/27/19  04:55    


 


Plt Count  383 K/mm3 (140-440)   02/27/19  04:55    


 


Lymph % (Auto)  12.1 % (13.4-35.0)  L  02/22/19  03:53    


 


Mono % (Auto)  12.7 % (0.0-7.3)  H  02/22/19  03:53    


 


Eos % (Auto)  0.6 % (0.0-4.3)   02/22/19  03:53    


 


Baso % (Auto)  0.3 % (0.0-1.8)   02/22/19  03:53    


 


Lymph #  1.3 K/mm3 (1.2-5.4)   02/22/19  03:53    


 


Mono #  1.4 K/mm3 (0.0-0.8)  H  02/22/19  03:53    


 


Eos #  0.1 K/mm3 (0.0-0.4)   02/22/19  03:53    


 


Baso #  0.0 K/mm3 (0.0-0.1)   02/22/19  03:53    


 


Seg Neutrophils %  74.3 % (40.0-70.0)  H  02/22/19  03:53    


 


Seg Neutrophils #  8.1 K/mm3 (1.8-7.7)  H  02/22/19  03:53    


 


PT  15.2 Sec. (12.2-14.9)  H  02/21/19  21:02    


 


INR  1.13  (0.87-1.13)   02/21/19  21:02    


 


APTT  29.2 Sec. (24.2-36.6)   02/21/19  21:02    


 


D-Dimer  519.87 ng/mlDDU (0-234)  H  02/21/19  16:57    


 


Heparin Anti-Xa Level  0.56 U.I./ml (0.3-0.7)   02/25/19  01:15    


 


POC ABG pH  7.322  (7.35-7.45)  L  02/23/19  17:27    


 


POC ABG pCO2  33.3  (35-45)  L  02/23/19  17:27    


 


POC ABG pO2  104  ()   02/23/19  17:27    


 


POC ABG HCO3  17.2   02/23/19  17:27    


 


POC ABG Total CO2  18   02/23/19  17:27    


 


POC ABG O2 Sat  98   02/23/19  17:27    


 


POC ABG Base Excess  -9   02/23/19  17:27    


 


VBG pH  7.359  (7.320-7.420)   02/21/19  17:35    


 


FiO2  28 %  02/23/19  17:27    


 


Sodium  138 mmol/L (137-145)   02/27/19  04:55    


 


Potassium  4.0 mmol/L (3.6-5.0)   02/27/19  04:55    


 


Chloride  102.1 mmol/L ()   02/27/19  04:55    


 


Carbon Dioxide  26 mmol/L (22-30)   02/27/19  04:55    


 


Anion Gap  14 mmol/L  02/27/19  04:55    


 


BUN  9 mg/dL (7-17)   02/27/19  04:55    


 


Creatinine  0.6 mg/dL (0.7-1.2)  L  02/27/19  04:55    


 


Estimated GFR  > 60 ml/min  02/27/19  04:55    


 


BUN/Creatinine Ratio  15 %  02/27/19  04:55    


 


Glucose  101 mg/dL ()  H  02/27/19  04:55    


 


Lactic Acid  1.10 mmol/L (0.7-2.0)   02/21/19  17:35    


 


Calcium  8.7 mg/dL (8.4-10.2)   02/27/19  04:55    


 


Magnesium  1.90 mg/dL (1.7-2.3)   02/21/19  17:20    


 


Total Bilirubin  0.30 mg/dL (0.1-1.2)   02/23/19  05:08    


 


AST  20 units/L (5-40)   02/23/19  05:08    


 


ALT  18 units/L (7-56)   02/23/19  05:08    


 


Alkaline Phosphatase  77 units/L ()   02/23/19  05:08    


 


Troponin T  < 0.010 ng/mL (0.00-0.029)   02/21/19  15:52    


 


C-Reactive Protein  16.50 mg/dL (0.00-1.30)  H  02/23/19  18:45    


 


Total Protein  5.7 g/dL (6.3-8.2)  L  02/23/19  05:08    


 


Albumin  3.2 g/dL (3.9-5)  L  02/23/19  05:08    


 


Albumin/Globulin Ratio  1.3 %  02/23/19  05:08    


 


TSH  0.892 mlU/mL (0.270-4.200)   02/21/19  16:57    


 


Free T4  0.99 ng/dL (0.76-1.46)   02/21/19  16:57    


 


Urine Color  Yellow  (Yellow)   02/21/19  22:47    


 


Urine Turbidity  Clear  (Clear)   02/21/19  22:47    


 


Urine pH  5.0  (5.0-7.0)   02/21/19  22:47    


 


Ur Specific Gravity  1.054  (1.003-1.030)  H  02/21/19  22:47    


 


Urine Protein  <15 mg/dl mg/dL (Negative)   02/21/19  22:47    


 


Urine Glucose (UA)  Neg mg/dL (Negative)   02/21/19  22:47    


 


Urine Ketones  Neg mg/dL (Negative)   02/21/19  22:47    


 


Urine Blood  Neg  (Negative)   02/21/19  22:47    


 


Urine Nitrite  Neg  (Negative)   02/21/19  22:47    


 


Urine Bilirubin  Neg  (Negative)   02/21/19  22:47    


 


Urine Urobilinogen  < 2.0 mg/dL (<2.0)   02/21/19  22:47    


 


Ur Leukocyte Esterase  Neg  (Negative)   02/21/19  22:47    


 


Urine WBC (Auto)  < 1.0 /HPF (0.0-6.0)   02/21/19  22:47    


 


Urine RBC (Auto)  6.0 /HPF (0.0-6.0)   02/21/19  22:47    


 


U Epithel Cells (Auto)  < 1.0 /HPF (0-13.0)   02/21/19  22:47    


 


Urine Mucus  Few /HPF  02/21/19  22:47

## 2019-02-27 NOTE — XRAY REPORT
AP CHEST:



HISTORY: Pneumonia



Compared to 02/22/19. Hazy opacities throughout most of the left lung 

appears stable. The right lung is clear. Heart size is at the upper 

limits of normal. Right venous catheter remains in good position.



IMPRESSION:

No change.

## 2019-02-27 NOTE — PROGRESS NOTE
Assessment and Plan





Acute hypoxemic respiratory failure.


Sepsis


Atrial fibrillation with rapid ventricular response, new onset.


Obesity.


Obesity hypoventilation syndrome, plus obstructive sleep apnea.


History of coronary artery disease.


Anemia that is normocytic.


Elevated D-dimer.


Mild hyponatremia.


Mild metabolic acidosis.


Left lower extremity wound.








-Continue supplemental oxygen to keep O2 sats 88-90%


-Nocturnal BIPAP and prn during the day when asleep


- prn ABG and  CXR


- continue bronchodilators with pulmonary hygiene per RT


- continue empiric CAP antibiotics to complete course


- continue amiodarone for rate control


- continue glycemic control with SSI for target -180 mg/dl 


- weight loss counseled


- continue full anticoagulation for A-fib


- negative VTE w/up


- aspiration precautions


- PT/OT, increase activity


- wound care per WCT for left lower extremity wound


- continue other care per attending /other consultants


-discharge planning








Subjective


Date of service: 02/27/19


Principal diagnosis: Acute hypoxemic resp failure; AFib with RVR; Obesity; 

SILVANA/OHS; CAD.


Interval history: 








Patient is seen today for: Acute hypoxemic respiratory failure; Atrial 

fibrillation with rapid ventricular response, new onset; Obesity; Obesity 

hypoventilation syndrome, plus obstructive sleep apnea; History of coronary a

rtery disease.





Seen and examined at bedside; 24hour events reviewed; nursing and respiratory 

care staff consulted; no adverse overnight events reported to me; resting 

peacefully in bed; no chest pain, states she is breathing better. Compliant with

NIPPV at night. No nausea or vomiting, no fevers or chills








Objective


                               Vital Signs - 12hr











  02/27/19 02/27/19 02/27/19





  04:16 04:17 08:08


 


Temperature  97.5 F L 


 


Pulse Rate 58 L 61 


 


Pulse Rate [   94 H





Anterior   





Bilateral   





Throughout]   


 


Respiratory 18 20 





Rate   


 


Respiratory   22





Rate [Anterior   





Bilateral   





Throughout]   


 


Blood Pressure  126/50 


 


O2 Sat by Pulse 99 99 





Oximetry   














  02/27/19 02/27/19 02/27/19





  08:18 08:37 09:19


 


Temperature  98.3 F 


 


Pulse Rate  64 


 


Pulse Rate [ 96 H  





Anterior   





Bilateral   





Throughout]   


 


Respiratory  20 





Rate   


 


Respiratory 22  





Rate [Anterior   





Bilateral   





Throughout]   


 


Blood Pressure  121/44 


 


O2 Sat by Pulse  100 99





Oximetry   














  02/27/19





  10:11


 


Temperature 


 


Pulse Rate 131 H


 


Pulse Rate [ 





Anterior 





Bilateral 





Throughout] 


 


Respiratory 





Rate 


 


Respiratory 





Rate [Anterior 





Bilateral 





Throughout] 


 


Blood Pressure 113/63


 


O2 Sat by Pulse 





Oximetry 











Constitutional: appears uncomfortable, other (elderly looking morbidly obese CF,

normocephalic and atraumatic with mildly increased resp effort at rest)


Eyes: non-icteric


ENT: oropharynx moist, other (Mallampati 4)


Neck: supple, no lymphadenopathy, no JVD, other (no thyromegaly)


Effort: mildly labored


Ascultation: Bilateral: diminished breath sounds, rhonchi


Percussion: Bilateral: not dull


Cardiovascular: irregular rhythm


Gastrointestinal: normoactive bowel sounds, soft, non-tender, non-distended, 

other (No HSM)


Integumentary: rash


Extremities: no cyanosis, pink and warm, pulses normal, no ischemia or ernie

chiae, edema (1+)


Neurologic: normal mental status, non-focal exam (grossly), pupils equal and 

round, CN II-XII normal, motor strength normal and


Psychiatric: mood appropriate, affect normal


CBC and BMP: 


                                 02/27/19 04:55





                                 02/27/19 04:55


ABG, PT/INR, D-dimer: 


ABG











POC ABG pH  7.322  (7.35-7.45)  L  02/23/19  17:27    


 


POC ABG pCO2  33.3  (35-45)  L  02/23/19  17:27    


 


POC ABG pO2  104  ()   02/23/19  17:27    


 


POC ABG HCO3  17.2   02/23/19  17:27    


 


POC ABG Total CO2  18   02/23/19  17:27    


 


POC ABG O2 Sat  98   02/23/19  17:27    





PT/INR, D-dimer











PT  15.2 Sec. (12.2-14.9)  H  02/21/19  21:02    


 


INR  1.13  (0.87-1.13)   02/21/19  21:02    


 


D-Dimer  519.87 ng/mlDDU (0-234)  H  02/21/19  16:57    








Abnormal lab findings: 


                                  Abnormal Labs











  02/21/19 02/21/19 02/21/19





  10:08 10:08 16:57


 


RBC   


 


Hgb   


 


Hct   


 


MCH  26 L  


 


RDW  17.6 H  


 


Lymph % (Auto)  4.8 L  


 


Mono % (Auto)  8.5 H  


 


Lymph #  0.5 L  


 


Mono #   


 


Seg Neutrophils %  84.9 H  


 


Seg Neutrophils #  8.4 H  


 


PT   


 


D-Dimer    519.87 H


 


Heparin Anti-Xa Level   


 


POC ABG pH   


 


POC ABG pCO2   


 


Sodium   134 L 


 


Carbon Dioxide   20 L 


 


BUN   21 H 


 


Creatinine   


 


Glucose   138 H 


 


Calcium   


 


C-Reactive Protein   


 


Total Protein   


 


Albumin   3.8 L 


 


Ur Specific Gravity   














  02/21/19 02/21/19 02/21/19





  21:02 22:47 23:12


 


RBC   


 


Hgb    9.7 L


 


Hct   


 


MCH   


 


RDW   


 


Lymph % (Auto)   


 


Mono % (Auto)   


 


Lymph #   


 


Mono #   


 


Seg Neutrophils %   


 


Seg Neutrophils #   


 


PT  15.2 H  


 


D-Dimer   


 


Heparin Anti-Xa Level   


 


POC ABG pH   


 


POC ABG pCO2   


 


Sodium   


 


Carbon Dioxide   


 


BUN   


 


Creatinine   


 


Glucose   


 


Calcium   


 


C-Reactive Protein   


 


Total Protein   


 


Albumin   


 


Ur Specific Gravity   1.054 H 














  02/22/19 02/22/19 02/22/19





  03:53 03:53 03:53


 


RBC   


 


Hgb   


 


Hct   


 


MCH  26 L  


 


RDW  18.1 H  


 


Lymph % (Auto)  12.1 L  


 


Mono % (Auto)  12.7 H  


 


Lymph #   


 


[place] #  1.4 H  


 


Seg Neutrophils %  74.3 H  


 


Seg Neutrophils #  8.1 H  


 


PT   


 


D-Dimer   


 


Heparin Anti-Xa Level    0.74 H


 


POC ABG pH   


 


POC ABG pCO2   


 


Sodium   134 L 


 


Carbon Dioxide   17 L 


 


BUN   


 


Creatinine   


 


Glucose   162 H 


 


Calcium   7.3 L D 


 


C-Reactive Protein   


 


Total Protein   


 


Albumin   


 


Ur Specific Gravity   














  02/23/19 02/23/19 02/23/19





  05:08 05:08 17:27


 


RBC  3.36 L  


 


Hgb  8.6 L  


 


Hct  26.5 L  


 


MCH  26 L  


 


RDW  17.8 H  


 


Lymph % (Auto)   


 


Mono % (Auto)   


 


Lymph #   


 


Mono #   


 


Seg Neutrophils %   


 


Seg Neutrophils #   


 


PT   


 


D-Dimer   


 


Heparin Anti-Xa Level   


 


POC ABG pH    7.322 L


 


POC ABG pCO2    33.3 L


 


Sodium   134 L 


 


Carbon Dioxide   20 L 


 


BUN   


 


Creatinine   


 


Glucose   114 H 


 


Calcium   7.6 L 


 


C-Reactive Protein   


 


Total Protein   5.7 L 


 


Albumin   3.2 L 


 


Ur Specific Gravity   














  02/23/19 02/24/19 02/24/19





  18:45 08:15 17:25


 


RBC   


 


Hgb   


 


Hct   


 


MCH   


 


RDW   


 


Lymph % (Auto)   


 


Mono % (Auto)   


 


Lymph #   


 


Mono #   


 


Seg Neutrophils %   


 


Seg Neutrophils #   


 


PT   


 


D-Dimer   


 


Heparin Anti-Xa Level   0.26 L  0.21 L


 


POC ABG pH   


 


POC ABG pCO2   


 


Sodium   


 


Carbon Dioxide   


 


BUN   


 


Creatinine   


 


Glucose   


 


Calcium   


 


C-Reactive Protein  16.50 H  


 


Total Protein   


 


Albumin   


 


Ur Specific Gravity   














  02/25/19 02/27/19 02/27/19





  05:30 04:55 04:55


 


RBC   3.35 L 


 


Hgb  8.5 L  8.5 L 


 


Hct  26.4 L  26.5 L 


 


MCH   26 L 


 


RDW   17.3 H 


 


Lymph % (Auto)   


 


Mono % (Auto)   


 


Lymph #   


 


Mono #   


 


Seg Neutrophils %   


 


Seg Neutrophils #   


 


PT   


 


D-Dimer   


 


Heparin Anti-Xa Level   


 


POC ABG pH   


 


POC ABG pCO2   


 


Sodium   


 


Carbon Dioxide   


 


BUN   


 


Creatinine    0.6 L


 


Glucose    101 H


 


Calcium   


 


C-Reactive Protein   


 


Total Protein   


 


Albumin   


 


Ur Specific Gravity   











Additional Studies: 





Echocardiogram: (2/2019: EF 50-55%. 7/2018: EF 55-60%)


Allied health notes reviewed: nursing

## 2019-02-28 RX ADMIN — Medication SCH ML: at 21:01

## 2019-02-28 RX ADMIN — METOPROLOL TARTRATE SCH MG: 50 TABLET, FILM COATED ORAL at 21:00

## 2019-02-28 RX ADMIN — APIXABAN SCH MG: 5 TABLET, FILM COATED ORAL at 21:00

## 2019-02-28 RX ADMIN — BUPROPION HYDROCHLORIDE SCH MG: 100 TABLET, FILM COATED, EXTENDED RELEASE ORAL at 10:28

## 2019-02-28 RX ADMIN — OXYCODONE AND ACETAMINOPHEN PRN TAB: 5; 325 TABLET ORAL at 21:01

## 2019-02-28 RX ADMIN — BUPROPION HYDROCHLORIDE SCH MG: 100 TABLET, FILM COATED, EXTENDED RELEASE ORAL at 21:00

## 2019-02-28 RX ADMIN — OXYCODONE AND ACETAMINOPHEN PRN TAB: 5; 325 TABLET ORAL at 08:02

## 2019-02-28 RX ADMIN — AZITHROMYCIN SCH MLS/HR: 500 INJECTION, POWDER, LYOPHILIZED, FOR SOLUTION INTRAVENOUS at 11:36

## 2019-02-28 RX ADMIN — CEFTRIAXONE SODIUM SCH MLS/HR: 2 INJECTION, POWDER, FOR SOLUTION INTRAMUSCULAR; INTRAVENOUS at 10:29

## 2019-02-28 RX ADMIN — LORAZEPAM PRN MG: 2 INJECTION INTRAMUSCULAR; INTRAVENOUS at 11:35

## 2019-02-28 RX ADMIN — FAMOTIDINE SCH MG: 20 TABLET ORAL at 10:26

## 2019-02-28 RX ADMIN — FAMOTIDINE SCH MG: 20 TABLET ORAL at 21:00

## 2019-02-28 RX ADMIN — APIXABAN SCH MG: 5 TABLET, FILM COATED ORAL at 10:26

## 2019-02-28 RX ADMIN — METOPROLOL TARTRATE SCH MG: 50 TABLET, FILM COATED ORAL at 10:25

## 2019-02-28 RX ADMIN — Medication SCH ML: at 10:30

## 2019-02-28 NOTE — DISCHARGE SUMMARY
Providers





- Providers


Date of Admission: 


02/21/19 20:14





Date of discharge: 02/28/19


Attending physician: 


ERVIN JOHNSON





                                        





02/21/19 17:10


Consult to Physician [CONS] Urgent 


   Comment: Dr. Wolf spoke with Dr. Mayo @ [phone]


   Consulting Provider: LAURI MAYO


   Physician Instructions: 


   Reason For Exam: aflutter, rvr, hypotension





02/21/19 20:15


Consult to Physician [CONS] Urgent 


   Comment: 


   Consulting Provider: ANETTE ARTIS


   Physician Instructions: 


   Reason For Exam: left leg wound





02/21/19 20:19


Consult to Physician [CONS] Routine 


   Comment: Spoke with Dr. Garland @ [phone]


   Consulting Provider: DOUG GARLAND


   Physician Instructions: 


   Reason For Exam: afib, rvr, hypotension





02/21/19 20:45


Consult to Wound/ET Nurse [CONS] Routine 


   Reason For Exam: wound eval





02/28/19 08:43


Physical Therapy Evaluation and Treat [CONS] Routine 


   Comment: 


   Reason For Exam: weakness, poor ambulation











Primary care physician: 


ORVILLE GARAY








Hospitalization


Condition: Fair


Hospital course: 


Patient is 63 YO Female with morbid obesity,  CAD s/p stent placement, 

hypertension, sleep apnea on CPAP. She presentsed to ED for evaluation for s

hortness of breath, chest palpitations, and chest discomfort for 2 days. Patient

 was initially seen and evaluated by her PCP and was initially treated with oral

 antibiotic therapy without relief of symptoms that were suspected secondary to 

a respiratory tract infection. Patient subsequently presents to ED for 

evaluation. Pt seen and evaluated in ED and found to have Atrial Flutter with 

RVR, Acute Respiratory Failure, Sepsis due to pneumonia, as well as Left Leg 

Cellulitis. She was evaluated in Emergency Department, started on Cardizem, then

 Amiodarone drip and heparin drip and admitted to ICU. She was on iv Ceftrixone,

 Azithromycin, Vancomycin. for pneumonia and leg cellulitis. She improved 

slowly, shortness of breath subsided and she was discharged home on 3/1/19 to 

follow as outpatient.





Total time spent on discharge, 32 mins





Disposition: DC-01 TO HOME OR SELFCARE





- Discharge Diagnoses


(1) Acute and chronic respiratory failure


Status: Acute   





(2) Cellulitis


Status: Acute   


Qualifiers: 


   Site of cellulitis of extremity: lower extremity   Laterality: left 





(3) Leg wound, left


Status: Acute   





(4) CAD (coronary artery disease)


Status: Chronic   





(5) Obesity hypoventilation syndrome


Status: Chronic   





(6) Sleep apnea


Status: Chronic   





(7) Stented coronary artery


Status: Chronic   





(8) Atrial flutter with rapid ventricular response


Status: Resolved   





(9) Hypotension


Status: Resolved   





(10) Sepsis


Status: Acute   





Core Measure Documentation





- Palliative Care


Palliative Care/ Comfort Measures: Not Applicable





- Core Measures


Any of the following diagnoses?: none





Exam





- Constitutional


Vitals: 


                                        











Temp Pulse Resp BP Pulse Ox


 


 98.6 F   61   20   126/63   98 


 


 02/28/19 11:12  02/28/19 11:12  02/28/19 11:12  02/28/19 11:12  02/28/19 11:12














Plan


Activity: advance as tolerated


Diet: low fat, low cholesterol, low salt


Additional Instructions: 1.Follow up with PCP in 1 week.  2.Follow up with Dr. Dorado in 1 week.  3.Follow up with Dr. Nava in 1-2 weeks.  4.Continue CPAP at 

home nightly.  5.Follow up [facility]


Follow up with: 


ORVILLE GARAY MD [Primary Care Provider] - 3-5 Days


Prescriptions: 


Apixaban [Eliquis] 5 mg PO Q12HR #60 tablet


cefUROXime [Ceftin] 500 mg PO Q12H 3 Days  tablet


Famotidine [Pepcid] 20 mg PO BID #60 tablet


Metoprolol [Lopressor TAB] 50 mg PO BID #60 tablet

## 2019-02-28 NOTE — PROGRESS NOTE
Assessment and Plan


Pt remains in SR. Currently stable cardiac status. Cont present cardiac 

management. 


Nothing further to add from cardiac perspective at this time. Will sign off. 


Recommend pt follow up in our office with Dr. Nava within 1-2 weeks of hospital 

discharge ([phone]).





The patient has been seen in conjunction with Dr. Nava who agrees with the 

assessment and plan of care. 








- Patient Problems


(1) Atrial flutter with rapid ventricular response


Current Visit: Yes   Status: Resolved   





(2) Pulmonary infiltrate


Current Visit: Yes   Status: Acute   





(3) Hypotension


Current Visit: Yes   Status: Resolved   





(4) CAD (coronary artery disease)


Current Visit: Yes   Status: Chronic   





(5) Stented coronary artery


Current Visit: Yes   Status: Chronic   





(6) Leg wound, left


Current Visit: Yes   Status: Acute   





(7) Morbid obesity


Current Visit: Yes   Status: Chronic   





(8) Obesity hypoventilation syndrome


Current Visit: Yes   Status: Chronic   





(9) Sleep apnea


Current Visit: Yes   Status: Chronic   





(10) Anemia


Current Visit: Yes   Status: Acute   





Subjective


Date of service: 02/28/19


Principal diagnosis: Acute hypoxemic resp failure; AFib with RVR; Obesity; 

SILVANA/OHS; CAD.


Interval history: 


pt resting in bed, remains in SR. 





Objective








                                Last Vital Signs











Temp  98.4 F   02/28/19 07:39


 


Pulse  63   02/28/19 10:25


 


Resp  20   02/28/19 08:55


 


BP  117/61   02/28/19 10:25


 


Pulse Ox  98   02/28/19 09:46














- Physical Examination


General: No Apparent Distress


HEENT: Positive: PERRL, Normocephaly, Mucus Membranes Moist


Neck: Positive: neck supple, trachea midline


Cardiac: Positive: Reg Rate and Rhythm, S1/S2


Lungs: Positive: Decreased Breath Sounds


Neuro: Positive: Grossly Intact


Abdomen: Positive: Soft.  Negative: Tender


Skin: Negative: Rash, Wound


Musculoskeletal: No Pain


Extremities: Absent: edema





- Imaging and Cardiology


EKG: report reviewed, image reviewed


Echo: report reviewed (2/2019: EF 50-55%. 7/2018: EF 55-60%)





- Telemetry


EKG Rhythm: Sinus Rhythm





- Allied health notes


Allied health notes reviewed: nursing

## 2019-03-01 VITALS — SYSTOLIC BLOOD PRESSURE: 122 MMHG | DIASTOLIC BLOOD PRESSURE: 64 MMHG

## 2019-03-01 RX ADMIN — METOPROLOL TARTRATE SCH MG: 50 TABLET, FILM COATED ORAL at 10:08

## 2019-03-01 RX ADMIN — Medication SCH: at 09:51

## 2019-03-01 RX ADMIN — CEFTRIAXONE SODIUM SCH: 2 INJECTION, POWDER, FOR SOLUTION INTRAMUSCULAR; INTRAVENOUS at 09:51

## 2019-03-01 RX ADMIN — BUPROPION HYDROCHLORIDE SCH MG: 100 TABLET, FILM COATED, EXTENDED RELEASE ORAL at 10:08

## 2019-03-01 RX ADMIN — FAMOTIDINE SCH MG: 20 TABLET ORAL at 10:07

## 2019-03-01 RX ADMIN — APIXABAN SCH MG: 5 TABLET, FILM COATED ORAL at 10:07

## 2019-03-01 NOTE — PROGRESS NOTE
Assessment and Plan


Assessment and plan: 


Patient is 65 YO Female with MO,Obesity Hypoventilation on CPAP,  CAD S/P Stent 

Placement, HTN presents to ED for evaluation. Pt states that she has experienced

shortness of breath, chest palpitations, and chest discomfort over the past 2 

days with worsening symptoms over the same time frame. Pt seen and evaluated by 

her PCP and was initially treated with oral antibiotic therapy without relief of

symptoms that were suspected secondary to a respiratory tract infection. Pt 

subsequently presents to ED for evaluation. Pt seen and evaluated in ED and 

found to have Atrial Flutter with RVR, Acute Respiratory Failure, as well as 

Left Leg Cellulitis. Pt treated with medical cardioversion with cardizem without

improvement. Pt then placed on amidarone drip and heparin drip as per cardiology

request. Pt declined synchronized cardioversion. 





* On admission the patient was placed in the intensive care unit started on 

  amiodarone drip and also pressor.


* She subsequently converted to sinus rhythm and Lopressor was initiated 

  following blood pressure improvement and pressor being discontinued


* Cardiology also converted from heparin drip to eliquis 5mg twice a day


* Imaging studies demonstrated multifocal infiltrate as noted and Patient was 

  treated in accordance





Acute Respiratory failure due to multifocal pneumonia


Sepsis- POA. On review of record patient had low grade fever on admission. CXR 

also shows mutifocal infiltrate


Atrial flutter with RVR


Shock STATE. 


Bronchiectasis


Sleep apnea


Anemia


CAD with hx of PCI


Left lower ext wound with resolving cellulites- wound culture showing MRSA


Obesity Hypoventilation Syndrome


Morbid obesity


Depression








Plan


Review of xray shows Multifocal pneumonia


Doubt septic shock. Low BP Likely from aflutter


Continue Rocephin and Zithromax 


Surgical input noted


On Metoprolol


Off Amiodarone.


DVT/GI prophy


Plan was to discharge home today but she complained of shortness of breath so 

discharge cancelled. Re-evaluate tomorrow.

















History


Interval history: 


Patient complained of shortness of breath so discharge cancelled.


No more fever








Hospitalist Physical





- Physical exam


Narrative exam: 








GEN: Not in acute distress, lying in bed, morbidly obese


HEENT: Normocephalic, atraumatic, 


Neck: supple, No JVD


Lungs: Decreased breath sounds  no wheeze


Heart:S1 and S2 regular, no murmurs, rubs or gallop,  


Abd:soft, non tender, non distended, normal bowel sounds


Ext: No edema, no clubbing or cyanosis


Neuro:Awake,alert,oriented x 3, moves all ext, no focal neurological signs





- Constitutional


Vitals: 


                                        











Temp Pulse Resp BP Pulse Ox


 


 98.0 F   69   15   144/83   100 


 


 02/28/19 20:54  03/01/19 00:54  03/01/19 00:54  02/28/19 20:54  03/01/19 00:54











General appearance: Present: obese





Results





- Labs


CBC & Chem 7: 


                                 02/27/19 04:55





                                 02/27/19 04:55


Labs: 


                             Laboratory Last Values











WBC  7.5 K/mm3 (4.5-11.0)   02/27/19  04:55    


 


RBC  3.35 M/mm3 (3.65-5.03)  L  02/27/19  04:55    


 


Hgb  8.5 gm/dl (10.1-14.3)  L  02/27/19  04:55    


 


Hct  26.5 % (30.3-42.9)  L  02/27/19  04:55    


 


MCV  79 fl (79-97)   02/27/19  04:55    


 


MCH  26 pg (28-32)  L  02/27/19  04:55    


 


MCHC  32 % (30-34)   02/27/19  04:55    


 


RDW  17.3 % (13.2-15.2)  H  02/27/19  04:55    


 


Plt Count  383 K/mm3 (140-440)   02/27/19  04:55    


 


Lymph % (Auto)  12.1 % (13.4-35.0)  L  02/22/19  03:53    


 


Mono % (Auto)  12.7 % (0.0-7.3)  H  02/22/19  03:53    


 


Eos % (Auto)  0.6 % (0.0-4.3)   02/22/19  03:53    


 


Baso % (Auto)  0.3 % (0.0-1.8)   02/22/19  03:53    


 


Lymph #  1.3 K/mm3 (1.2-5.4)   02/22/19  03:53    


 


Mono #  1.4 K/mm3 (0.0-0.8)  H  02/22/19  03:53    


 


Eos #  0.1 K/mm3 (0.0-0.4)   02/22/19  03:53    


 


Baso #  0.0 K/mm3 (0.0-0.1)   02/22/19  03:53    


 


Seg Neutrophils %  74.3 % (40.0-70.0)  H  02/22/19  03:53    


 


Seg Neutrophils #  8.1 K/mm3 (1.8-7.7)  H  02/22/19  03:53    


 


PT  15.2 Sec. (12.2-14.9)  H  02/21/19  21:02    


 


INR  1.13  (0.87-1.13)   02/21/19  21:02    


 


APTT  29.2 Sec. (24.2-36.6)   02/21/19  21:02    


 


D-Dimer  519.87 ng/mlDDU (0-234)  H  02/21/19  16:57    


 


Heparin Anti-Xa Level  0.56 U.I./ml (0.3-0.7)   02/25/19  01:15    


 


POC ABG pH  7.322  (7.35-7.45)  L  02/23/19  17:27    


 


POC ABG pCO2  33.3  (35-45)  L  02/23/19  17:27    


 


POC ABG pO2  104  ()   02/23/19  17:27    


 


POC ABG HCO3  17.2   02/23/19  17:27    


 


POC ABG Total CO2  18   02/23/19  17:27    


 


POC ABG O2 Sat  98   02/23/19  17:27    


 


POC ABG Base Excess  -9   02/23/19  17:27    


 


VBG pH  7.359  (7.320-7.420)   02/21/19  17:35    


 


FiO2  28 %  02/23/19  17:27    


 


Sodium  138 mmol/L (137-145)   02/27/19  04:55    


 


Potassium  4.0 mmol/L (3.6-5.0)   02/27/19  04:55    


 


Chloride  102.1 mmol/L ()   02/27/19  04:55    


 


Carbon Dioxide  26 mmol/L (22-30)   02/27/19  04:55    


 


Anion Gap  14 mmol/L  02/27/19  04:55    


 


BUN  9 mg/dL (7-17)   02/27/19  04:55    


 


Creatinine  0.6 mg/dL (0.7-1.2)  L  02/27/19  04:55    


 


Estimated GFR  > 60 ml/min  02/27/19  04:55    


 


BUN/Creatinine Ratio  15 %  02/27/19  04:55    


 


Glucose  101 mg/dL ()  H  02/27/19  04:55    


 


Lactic Acid  1.10 mmol/L (0.7-2.0)   02/21/19  17:35    


 


Calcium  8.7 mg/dL (8.4-10.2)   02/27/19  04:55    


 


Magnesium  1.90 mg/dL (1.7-2.3)   02/21/19  17:20    


 


Total Bilirubin  0.30 mg/dL (0.1-1.2)   02/23/19  05:08    


 


AST  20 units/L (5-40)   02/23/19  05:08    


 


ALT  18 units/L (7-56)   02/23/19  05:08    


 


Alkaline Phosphatase  77 units/L ()   02/23/19  05:08    


 


Troponin T  < 0.010 ng/mL (0.00-0.029)   02/21/19  15:52    


 


C-Reactive Protein  16.50 mg/dL (0.00-1.30)  H  02/23/19  18:45    


 


Total Protein  5.7 g/dL (6.3-8.2)  L  02/23/19  05:08    


 


Albumin  3.2 g/dL (3.9-5)  L  02/23/19  05:08    


 


Albumin/Globulin Ratio  1.3 %  02/23/19  05:08    


 


TSH  0.892 mlU/mL (0.270-4.200)   02/21/19  16:57    


 


Free T4  0.99 ng/dL (0.76-1.46)   02/21/19  16:57    


 


Urine Color  Yellow  (Yellow)   02/21/19  22:47    


 


Urine Turbidity  Clear  (Clear)   02/21/19  22:47    


 


Urine pH  5.0  (5.0-7.0)   02/21/19  22:47    


 


Ur Specific Gravity  1.054  (1.003-1.030)  H  02/21/19  22:47    


 


Urine Protein  <15 mg/dl mg/dL (Negative)   02/21/19  22:47    


 


Urine Glucose (UA)  Neg mg/dL (Negative)   02/21/19  22:47    


 


Urine Ketones  Neg mg/dL (Negative)   02/21/19  22:47    


 


Urine Blood  Neg  (Negative)   02/21/19  22:47    


 


Urine Nitrite  Neg  (Negative)   02/21/19  22:47    


 


Urine Bilirubin  Neg  (Negative)   02/21/19  22:47    


 


Urine Urobilinogen  < 2.0 mg/dL (<2.0)   02/21/19  22:47    


 


Ur Leukocyte Esterase  Neg  (Negative)   02/21/19  22:47    


 


Urine WBC (Auto)  < 1.0 /HPF (0.0-6.0)   02/21/19  22:47    


 


Urine RBC (Auto)  6.0 /HPF (0.0-6.0)   02/21/19  22:47    


 


U Epithel Cells (Auto)  < 1.0 /HPF (0-13.0)   02/21/19  22:47    


 


Urine Mucus  Few /HPF  02/21/19  22:47    














Nutrition/Malnutrition Assess





- Dietary Evaluation


Nutrition/Malnutrition Findings: 


                                        





Nutrition Notes                                            Start:  02/28/19 

10:04


Freq:                                                      Status: Active       




Protocol:                                                                       




 Document     02/28/19 10:05  ANA  (Rec: 02/28/19 10:39    [email or web address])


 Co-Sign      02/28/19 10:05  LP


 Nutrition Notes


     Need for Assessment generated from:         LOS


     Initial or Follow up                        Assessment


     Current Diagnosis                           Coronary Artery Disease


                                                 Hypertension


     Other Pertinent Diagnosis                   Obesity hypoventilation,


                                                 cellulitis, DVT, wound on


                                                 lower L leg


     Current Diet                                Cardiac Diet


     Labs/Tests                                  Reviewed


     Pertinent Medications                       Ativan


     Height                                      5 ft 1 in


     Weight                                      143.3 kg


     Ideal Body Weight (kg)                      47.72


     BMI                                         59.7


     Intake Prior to Admission                   Fair


     Weight Status                               Morbidly Obese


     Subjective/Other Information                Pt screened for LOS. Pt.


                                                 stated she has recently had a


                                                 poor appetite. Stated she was


                                                 eating about 20% of her meals


                                                 in the hospital due to food


                                                 being bland. Pt. agreed to try


                                                 to eat a few bites of meals


                                                 every few minutes. Pt denied N


                                                 /V/D, constipation, chewing/


                                                 swallowing diffculties, and


                                                 recent weight loss. Food


                                                 preferences noted.


     Percent of energy/protein needs met:        25%/14%


     Burn                                        Absent


     Trauma                                      Absent


     Food Allergy                                No


     Current % PO                                Poor (25-49%)


     #1


      Nutrition Diagnosis                        Increased nutrient needs   (


                                                 specify in comment below)


      Comments:                                  Protein


      Etiology                                   Wound healing


      As Evidenced by Signs and Symptoms         Wound on left lower leg


     Is patient on ventilator?                   No


     Is Patient Ambulatory and/or Out of Bed     No


     REE-(Gilmer-St. Jeor-confined to bed)      2314.776


     Kcal/Kg value to use for calculation        12


     Approximate Energy Requirements Using       1720


      kcal/Kg                                    


     Calculation Used for Recommendations        Kcal/kg


     Additional Notes                            AdjBW: 95.5 kg


                                                 Pro needs: 119-143 g/day (1.25


                                                 -1.5 AdjBW)


                                                 Fluid needs: 1 ml/kcal or per


                                                 MD


 Nutrition Intervention


     Change Diet Order:                          Continue cardiac diet


     Add Supplement/Snack (indicate name/kcal    Felix BID + Ensure High


      /protein )                                 protein daily


     Provides kCal:                              350


     Provides Protein (gm)                       21


     Goal #1                                     Meet at least 75% of energy


                                                 and protein needs via PO and


                                                 ONS intakes


     Goal #2                                     Wound healing


     Anticipated Discharge Needs:                Cardiac diet


     Follow-Up By:                               03/05/19


     Additional Comments                         F/u for stable intakes, ONS


                                                 toleration

## 2019-03-01 NOTE — EVENT NOTE
Date: 03/01/19


patient had discharge orders put in yesterday, but complained of shortness of 

breath, so canceled. No more shortness of breath. Stable to go home today.

## 2019-03-04 ENCOUNTER — HOSPITAL ENCOUNTER (OUTPATIENT)
Dept: HOSPITAL 5 - WOUND | Age: 64
Discharge: HOME | End: 2019-03-04
Attending: SURGERY
Payer: COMMERCIAL
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
## 2019-03-04 DIAGNOSIS — I25.10: ICD-10-CM

## 2019-03-04 DIAGNOSIS — E78.00: ICD-10-CM

## 2019-03-04 DIAGNOSIS — Z87.891: ICD-10-CM

## 2019-03-04 DIAGNOSIS — S80.12XD: ICD-10-CM

## 2019-03-04 DIAGNOSIS — L97.822: Primary | ICD-10-CM

## 2019-03-11 ENCOUNTER — HOSPITAL ENCOUNTER (OUTPATIENT)
Dept: HOSPITAL 5 - WOUND | Age: 64
Discharge: HOME | End: 2019-03-11
Attending: SURGERY
Payer: COMMERCIAL

## 2019-03-11 DIAGNOSIS — I25.10: ICD-10-CM

## 2019-03-11 DIAGNOSIS — Z87.891: ICD-10-CM

## 2019-03-11 DIAGNOSIS — E78.00: ICD-10-CM

## 2019-03-11 DIAGNOSIS — L97.818: Primary | ICD-10-CM

## 2019-03-11 PROCEDURE — 99212 OFFICE O/P EST SF 10 MIN: CPT

## 2019-03-11 PROCEDURE — A6250 SKIN SEAL PROTECT MOISTURIZR: HCPCS

## 2019-03-11 PROCEDURE — G0463 HOSPITAL OUTPT CLINIC VISIT: HCPCS

## 2019-04-15 ENCOUNTER — HOSPITAL ENCOUNTER (OUTPATIENT)
Dept: HOSPITAL 5 - WOUND | Age: 64
Discharge: HOME | End: 2019-04-15
Attending: SURGERY
Payer: COMMERCIAL

## 2019-04-15 DIAGNOSIS — I70.248: ICD-10-CM

## 2019-04-15 DIAGNOSIS — I25.10: ICD-10-CM

## 2019-04-15 DIAGNOSIS — Z95.5: ICD-10-CM

## 2019-04-15 DIAGNOSIS — Z87.891: ICD-10-CM

## 2019-04-15 DIAGNOSIS — E78.00: ICD-10-CM

## 2019-04-15 DIAGNOSIS — E66.9: ICD-10-CM

## 2019-04-15 DIAGNOSIS — I87.302: Primary | ICD-10-CM

## 2019-04-15 DIAGNOSIS — L97.828: ICD-10-CM

## 2019-04-15 PROCEDURE — G0463 HOSPITAL OUTPT CLINIC VISIT: HCPCS

## 2019-04-15 PROCEDURE — 99215 OFFICE O/P EST HI 40 MIN: CPT

## 2021-04-05 NOTE — MAMMOGRAPHY REPORT
DIGITAL SCREENING MAMMOGRAM WITH CAD, 4/5/2021



CLINICAL INFORMATION / INDICATION: Routine screening mammography. 



TECHNIQUE:  Digital bilateral 2D mammography was obtained in the craniocaudal and mediolateral obliqu
e projections. This examination was interpreted with the benefit of Computer-Aided Detection analysis
.



COMPARISON: 6/29/2017, 6/28/2016



FINDINGS: 



Breast Density: The breasts are almost entirely fatty.



No dominant mass, suspicious calcifications, or architectural distortion in either breast. 





 

IMPRESSION: No mammographic evidence of malignancy.



Follow up recommendation: Routine yearly



BI-RADS Category 1:  Negative.





-------------------------------------------------------------------------------------------

A "normal" or negative report should not discourage follow up or biopsy of a clinically significant f
inding.



A written summary of these findings will be mailed to the patient. The patient will be entered into a
 mammography reporting system which will generate a reminder letter for the patient's next appointmen
t at the appropriate interval.



The [employer] recommends yearly mammograms starting at age 40 and continuing as l
kael as a woman is in good health.  Breast MRI is recommended for women with an approximate 20-25% or 
greater lifetime risk of breast cancer, including women with a strong family history of breast or ova
karoline cancer or who have been treated for Hodgkin's disease.



Signer Name: Ayden Ramirez MD 

Signed: 4/5/2021 2:08 PM

Workstation Name: [email or web address]